# Patient Record
Sex: MALE | Race: WHITE | NOT HISPANIC OR LATINO
[De-identification: names, ages, dates, MRNs, and addresses within clinical notes are randomized per-mention and may not be internally consistent; named-entity substitution may affect disease eponyms.]

---

## 2017-02-22 ENCOUNTER — RX RENEWAL (OUTPATIENT)
Age: 57
End: 2017-02-22

## 2017-04-27 ENCOUNTER — RX RENEWAL (OUTPATIENT)
Age: 57
End: 2017-04-27

## 2017-10-26 ENCOUNTER — RX RENEWAL (OUTPATIENT)
Age: 57
End: 2017-10-26

## 2017-12-18 ENCOUNTER — MEDICATION RENEWAL (OUTPATIENT)
Age: 57
End: 2017-12-18

## 2017-12-31 ENCOUNTER — RX RENEWAL (OUTPATIENT)
Age: 57
End: 2017-12-31

## 2018-04-30 ENCOUNTER — APPOINTMENT (OUTPATIENT)
Dept: INTERNAL MEDICINE | Facility: CLINIC | Age: 58
End: 2018-04-30
Payer: COMMERCIAL

## 2018-04-30 ENCOUNTER — NON-APPOINTMENT (OUTPATIENT)
Age: 58
End: 2018-04-30

## 2018-04-30 VITALS
OXYGEN SATURATION: 99 % | HEART RATE: 84 BPM | SYSTOLIC BLOOD PRESSURE: 143 MMHG | HEIGHT: 68 IN | RESPIRATION RATE: 17 BRPM | BODY MASS INDEX: 40.16 KG/M2 | WEIGHT: 265 LBS | DIASTOLIC BLOOD PRESSURE: 85 MMHG | TEMPERATURE: 97.9 F

## 2018-04-30 DIAGNOSIS — Z86.69 PERSONAL HISTORY OF OTHER DISEASES OF THE NERVOUS SYSTEM AND SENSE ORGANS: ICD-10-CM

## 2018-04-30 PROCEDURE — 93000 ELECTROCARDIOGRAM COMPLETE: CPT

## 2018-04-30 PROCEDURE — 99396 PREV VISIT EST AGE 40-64: CPT

## 2018-04-30 PROCEDURE — 82271 OCCULT BLOOD OTHER SOURCES: CPT | Mod: QW

## 2018-05-04 LAB
ALBUMIN SERPL ELPH-MCNC: 4.9 G/DL
ALP BLD-CCNC: 64 U/L
ALT SERPL-CCNC: 32 U/L
ANION GAP SERPL CALC-SCNC: 12 MMOL/L
APPEARANCE: CLEAR
AST SERPL-CCNC: 23 U/L
BASOPHILS # BLD AUTO: 0.08 K/UL
BASOPHILS NFR BLD AUTO: 1 %
BILIRUB SERPL-MCNC: 1.2 MG/DL
BILIRUBIN URINE: NEGATIVE
BLOOD URINE: NEGATIVE
BUN SERPL-MCNC: 15 MG/DL
CALCIUM SERPL-MCNC: 9.9 MG/DL
CHLORIDE SERPL-SCNC: 99 MMOL/L
CHOLEST SERPL-MCNC: 240 MG/DL
CHOLEST/HDLC SERPL: 3.2 RATIO
CO2 SERPL-SCNC: 26 MMOL/L
COLOR: YELLOW
CREAT SERPL-MCNC: 0.87 MG/DL
EOSINOPHIL # BLD AUTO: 0.29 K/UL
EOSINOPHIL NFR BLD AUTO: 3.6 %
GLUCOSE QUALITATIVE U: NEGATIVE MG/DL
GLUCOSE SERPL-MCNC: 104 MG/DL
HBA1C MFR BLD HPLC: 5.8 %
HCT VFR BLD CALC: 50.3 %
HCV AB SER QL: NONREACTIVE
HCV S/CO RATIO: 0.1 S/CO
HDLC SERPL-MCNC: 76 MG/DL
HGB BLD-MCNC: 16.7 G/DL
IMM GRANULOCYTES NFR BLD AUTO: 0.3 %
KETONES URINE: NEGATIVE
LDLC SERPL CALC-MCNC: 133 MG/DL
LEUKOCYTE ESTERASE URINE: NEGATIVE
LYMPHOCYTES # BLD AUTO: 1.95 K/UL
LYMPHOCYTES NFR BLD AUTO: 24.4 %
MAN DIFF?: NORMAL
MCHC RBC-ENTMCNC: 28.8 PG
MCHC RBC-ENTMCNC: 33.2 GM/DL
MCV RBC AUTO: 86.7 FL
MONOCYTES # BLD AUTO: 0.64 K/UL
MONOCYTES NFR BLD AUTO: 8 %
NEUTROPHILS # BLD AUTO: 5 K/UL
NEUTROPHILS NFR BLD AUTO: 62.7 %
NITRITE URINE: NEGATIVE
PH URINE: 6
PLATELET # BLD AUTO: 247 K/UL
POTASSIUM SERPL-SCNC: 4.5 MMOL/L
PROT SERPL-MCNC: 8.1 G/DL
PROTEIN URINE: NEGATIVE MG/DL
PSA SERPL-MCNC: 0.56 NG/ML
RBC # BLD: 5.8 M/UL
RBC # FLD: 14.1 %
SODIUM SERPL-SCNC: 137 MMOL/L
SPECIFIC GRAVITY URINE: 1.01
TRIGL SERPL-MCNC: 154 MG/DL
TSH SERPL-ACNC: 2.2 UIU/ML
UROBILINOGEN URINE: NEGATIVE MG/DL
WBC # FLD AUTO: 7.98 K/UL

## 2018-05-29 ENCOUNTER — RX RENEWAL (OUTPATIENT)
Age: 58
End: 2018-05-29

## 2018-06-13 ENCOUNTER — RX RENEWAL (OUTPATIENT)
Age: 58
End: 2018-06-13

## 2018-06-18 ENCOUNTER — RX RENEWAL (OUTPATIENT)
Age: 58
End: 2018-06-18

## 2018-08-31 ENCOUNTER — MEDICATION RENEWAL (OUTPATIENT)
Age: 58
End: 2018-08-31

## 2018-12-10 ENCOUNTER — RX RENEWAL (OUTPATIENT)
Age: 58
End: 2018-12-10

## 2019-01-18 ENCOUNTER — APPOINTMENT (OUTPATIENT)
Dept: INTERNAL MEDICINE | Facility: CLINIC | Age: 59
End: 2019-01-18
Payer: COMMERCIAL

## 2019-01-18 VITALS
SYSTOLIC BLOOD PRESSURE: 140 MMHG | HEART RATE: 53 BPM | RESPIRATION RATE: 17 BRPM | WEIGHT: 221 LBS | TEMPERATURE: 97.5 F | HEIGHT: 68 IN | BODY MASS INDEX: 33.49 KG/M2 | DIASTOLIC BLOOD PRESSURE: 80 MMHG | OXYGEN SATURATION: 100 %

## 2019-01-18 DIAGNOSIS — N40.1 BENIGN PROSTATIC HYPERPLASIA WITH LOWER URINARY TRACT SYMPMS: ICD-10-CM

## 2019-01-18 DIAGNOSIS — N13.8 BENIGN PROSTATIC HYPERPLASIA WITH LOWER URINARY TRACT SYMPMS: ICD-10-CM

## 2019-01-18 DIAGNOSIS — R73.03 PREDIABETES.: ICD-10-CM

## 2019-01-18 PROCEDURE — 99214 OFFICE O/P EST MOD 30 MIN: CPT

## 2019-01-20 PROBLEM — R73.03 PREDIABETES: Status: ACTIVE | Noted: 2018-04-30

## 2019-01-20 LAB
ALBUMIN SERPL ELPH-MCNC: 4.8 G/DL
ALP BLD-CCNC: 65 U/L
ALT SERPL-CCNC: 24 U/L
ANION GAP SERPL CALC-SCNC: 13 MMOL/L
AST SERPL-CCNC: 21 U/L
BILIRUB SERPL-MCNC: 1.3 MG/DL
BUN SERPL-MCNC: 15 MG/DL
CALCIUM SERPL-MCNC: 10.2 MG/DL
CHLORIDE SERPL-SCNC: 96 MMOL/L
CO2 SERPL-SCNC: 30 MMOL/L
CREAT SERPL-MCNC: 0.92 MG/DL
CRP SERPL-MCNC: 0.9 MG/DL
GLUCOSE SERPL-MCNC: 88 MG/DL
POTASSIUM SERPL-SCNC: 3.8 MMOL/L
PROT SERPL-MCNC: 8.1 G/DL
SODIUM SERPL-SCNC: 139 MMOL/L

## 2019-01-20 RX ORDER — CICLOPIROX OLAMINE 7.7 MG/ML
0.77 SUSPENSION TOPICAL
Qty: 60 | Refills: 0 | Status: COMPLETED | COMMUNITY
Start: 2018-10-01

## 2019-03-20 ENCOUNTER — MEDICATION RENEWAL (OUTPATIENT)
Age: 59
End: 2019-03-20

## 2019-04-03 ENCOUNTER — MEDICATION RENEWAL (OUTPATIENT)
Age: 59
End: 2019-04-03

## 2019-05-28 ENCOUNTER — APPOINTMENT (OUTPATIENT)
Dept: INTERNAL MEDICINE | Facility: CLINIC | Age: 59
End: 2019-05-28
Payer: COMMERCIAL

## 2019-05-28 VITALS
HEART RATE: 63 BPM | BODY MASS INDEX: 31.83 KG/M2 | SYSTOLIC BLOOD PRESSURE: 130 MMHG | RESPIRATION RATE: 17 BRPM | TEMPERATURE: 98 F | DIASTOLIC BLOOD PRESSURE: 84 MMHG | OXYGEN SATURATION: 98 % | HEIGHT: 68 IN | WEIGHT: 210 LBS

## 2019-05-28 VITALS — SYSTOLIC BLOOD PRESSURE: 118 MMHG | DIASTOLIC BLOOD PRESSURE: 78 MMHG

## 2019-05-28 DIAGNOSIS — Z12.11 ENCOUNTER FOR SCREENING FOR MALIGNANT NEOPLASM OF COLON: ICD-10-CM

## 2019-05-28 DIAGNOSIS — Z00.00 ENCOUNTER FOR GENERAL ADULT MEDICAL EXAMINATION W/OUT ABNORMAL FINDINGS: ICD-10-CM

## 2019-05-28 PROCEDURE — 99396 PREV VISIT EST AGE 40-64: CPT | Mod: 25

## 2019-05-28 PROCEDURE — 90715 TDAP VACCINE 7 YRS/> IM: CPT

## 2019-05-28 PROCEDURE — 82271 OCCULT BLOOD OTHER SOURCES: CPT | Mod: QW

## 2019-05-28 PROCEDURE — 90471 IMMUNIZATION ADMIN: CPT

## 2019-05-29 LAB
25(OH)D3 SERPL-MCNC: 17.4 NG/ML
ALBUMIN SERPL ELPH-MCNC: 4.5 G/DL
ALP BLD-CCNC: 72 U/L
ALT SERPL-CCNC: 25 U/L
ANION GAP SERPL CALC-SCNC: 14 MMOL/L
APPEARANCE: CLEAR
AST SERPL-CCNC: 18 U/L
BASOPHILS # BLD AUTO: 0.05 K/UL
BASOPHILS NFR BLD AUTO: 0.7 %
BILIRUB SERPL-MCNC: 0.5 MG/DL
BILIRUBIN URINE: NEGATIVE
BLOOD URINE: NEGATIVE
BUN SERPL-MCNC: 20 MG/DL
CALCIUM SERPL-MCNC: 9.8 MG/DL
CHLORIDE SERPL-SCNC: 104 MMOL/L
CHOLEST SERPL-MCNC: 179 MG/DL
CHOLEST/HDLC SERPL: 2.9 RATIO
CO2 SERPL-SCNC: 23 MMOL/L
COLOR: NORMAL
CREAT SERPL-MCNC: 0.82 MG/DL
EOSINOPHIL # BLD AUTO: 0.24 K/UL
EOSINOPHIL NFR BLD AUTO: 3.6 %
ESTIMATED AVERAGE GLUCOSE: 103 MG/DL
GLUCOSE QUALITATIVE U: NEGATIVE
GLUCOSE SERPL-MCNC: 114 MG/DL
HBA1C MFR BLD HPLC: 5.2 %
HCT VFR BLD CALC: 51.1 %
HDLC SERPL-MCNC: 61 MG/DL
HGB BLD-MCNC: 16.5 G/DL
IMM GRANULOCYTES NFR BLD AUTO: 0.3 %
KETONES URINE: NEGATIVE
LDLC SERPL CALC-MCNC: 105 MG/DL
LEUKOCYTE ESTERASE URINE: NEGATIVE
LYMPHOCYTES # BLD AUTO: 1.66 K/UL
LYMPHOCYTES NFR BLD AUTO: 24.8 %
MAN DIFF?: NORMAL
MCHC RBC-ENTMCNC: 28.5 PG
MCHC RBC-ENTMCNC: 32.3 GM/DL
MCV RBC AUTO: 88.3 FL
MONOCYTES # BLD AUTO: 0.47 K/UL
MONOCYTES NFR BLD AUTO: 7 %
NEUTROPHILS # BLD AUTO: 4.25 K/UL
NEUTROPHILS NFR BLD AUTO: 63.6 %
NITRITE URINE: NEGATIVE
PH URINE: 6
PLATELET # BLD AUTO: 246 K/UL
POTASSIUM SERPL-SCNC: 4.2 MMOL/L
PROT SERPL-MCNC: 7.7 G/DL
PROTEIN URINE: NORMAL
PSA SERPL-MCNC: 0.54 NG/ML
RBC # BLD: 5.79 M/UL
RBC # FLD: 12.9 %
SODIUM SERPL-SCNC: 141 MMOL/L
SPECIFIC GRAVITY URINE: 1.02
T4 FREE SERPL-MCNC: 1.3 NG/DL
TRIGL SERPL-MCNC: 64 MG/DL
TSH SERPL-ACNC: 2.81 UIU/ML
UROBILINOGEN URINE: NORMAL
VIT B12 SERPL-MCNC: 749 PG/ML
WBC # FLD AUTO: 6.69 K/UL

## 2019-07-01 ENCOUNTER — MEDICATION RENEWAL (OUTPATIENT)
Age: 59
End: 2019-07-01

## 2019-11-25 ENCOUNTER — TRANSCRIPTION ENCOUNTER (OUTPATIENT)
Age: 59
End: 2019-11-25

## 2020-01-29 ENCOUNTER — APPOINTMENT (OUTPATIENT)
Dept: INTERNAL MEDICINE | Facility: CLINIC | Age: 60
End: 2020-01-29
Payer: COMMERCIAL

## 2020-01-29 VITALS
DIASTOLIC BLOOD PRESSURE: 86 MMHG | HEIGHT: 68 IN | OXYGEN SATURATION: 99 % | RESPIRATION RATE: 17 BRPM | TEMPERATURE: 98.6 F | HEART RATE: 68 BPM | WEIGHT: 238 LBS | BODY MASS INDEX: 36.07 KG/M2 | SYSTOLIC BLOOD PRESSURE: 142 MMHG

## 2020-01-29 DIAGNOSIS — M79.641 PAIN IN RIGHT HAND: ICD-10-CM

## 2020-01-29 DIAGNOSIS — M79.642 PAIN IN RIGHT HAND: ICD-10-CM

## 2020-01-29 DIAGNOSIS — M17.0 BILATERAL PRIMARY OSTEOARTHRITIS OF KNEE: ICD-10-CM

## 2020-01-29 DIAGNOSIS — E66.9 OBESITY, UNSPECIFIED: ICD-10-CM

## 2020-01-29 PROCEDURE — 99214 OFFICE O/P EST MOD 30 MIN: CPT

## 2020-01-29 NOTE — PHYSICAL EXAM
[No Acute Distress] : no acute distress [Well Nourished] : well nourished [Normal Sclera/Conjunctiva] : normal sclera/conjunctiva [Well-Appearing] : well-appearing [Well Developed] : well developed [PERRL] : pupils equal round and reactive to light [EOMI] : extraocular movements intact [Normal Outer Ear/Nose] : the outer ears and nose were normal in appearance [Normal Oropharynx] : the oropharynx was normal [No JVD] : no jugular venous distention [No Lymphadenopathy] : no lymphadenopathy [Thyroid Normal, No Nodules] : the thyroid was normal and there were no nodules present [Supple] : supple [No Respiratory Distress] : no respiratory distress  [No Accessory Muscle Use] : no accessory muscle use [Normal Rate] : normal rate  [Clear to Auscultation] : lungs were clear to auscultation bilaterally [Regular Rhythm] : with a regular rhythm [Normal S1, S2] : normal S1 and S2 [No Murmur] : no murmur heard [No Carotid Bruits] : no carotid bruits [No Varicosities] : no varicosities [No Abdominal Bruit] : a ~M bruit was not heard ~T in the abdomen [Pedal Pulses Present] : the pedal pulses are present [No Edema] : there was no peripheral edema [No Palpable Aorta] : no palpable aorta [No Extremity Clubbing/Cyanosis] : no extremity clubbing/cyanosis [Non Tender] : non-tender [Soft] : abdomen soft [Non-distended] : non-distended [No Masses] : no abdominal mass palpated [Normal Bowel Sounds] : normal bowel sounds [No HSM] : no HSM [Normal Posterior Cervical Nodes] : no posterior cervical lymphadenopathy [No CVA Tenderness] : no CVA  tenderness [Normal Anterior Cervical Nodes] : no anterior cervical lymphadenopathy [No Joint Swelling] : no joint swelling [No Spinal Tenderness] : no spinal tenderness [No Rash] : no rash [Grossly Normal Strength/Tone] : grossly normal strength/tone [Coordination Grossly Intact] : coordination grossly intact [No Focal Deficits] : no focal deficits [Normal Affect] : the affect was normal [Deep Tendon Reflexes (DTR)] : deep tendon reflexes were 2+ and symmetric [Normal Gait] : normal gait [Normal Insight/Judgement] : insight and judgment were intact

## 2020-01-29 NOTE — HISTORY OF PRESENT ILLNESS
[FreeTextEntry1] : follow up hypertension, needs refills [de-identified] : 59 years old male with hypertension, here for follow up, ran out of medications, requesting refills, offers no acute complains, sttates doing well; states he had h/o pain both hands , wrist, and knees chronic

## 2020-06-01 ENCOUNTER — APPOINTMENT (OUTPATIENT)
Dept: INTERNAL MEDICINE | Facility: CLINIC | Age: 60
End: 2020-06-01

## 2020-10-29 ENCOUNTER — TRANSCRIPTION ENCOUNTER (OUTPATIENT)
Age: 60
End: 2020-10-29

## 2021-02-03 ENCOUNTER — EMERGENCY (EMERGENCY)
Facility: HOSPITAL | Age: 61
LOS: 1 days | Discharge: HOME | End: 2021-02-03
Attending: STUDENT IN AN ORGANIZED HEALTH CARE EDUCATION/TRAINING PROGRAM
Payer: COMMERCIAL

## 2021-02-03 VITALS
WEIGHT: 255.07 LBS | TEMPERATURE: 100 F | SYSTOLIC BLOOD PRESSURE: 127 MMHG | DIASTOLIC BLOOD PRESSURE: 83 MMHG | HEIGHT: 68 IN | OXYGEN SATURATION: 96 % | HEART RATE: 93 BPM | RESPIRATION RATE: 16 BRPM

## 2021-02-03 VITALS
OXYGEN SATURATION: 96 % | TEMPERATURE: 101 F | SYSTOLIC BLOOD PRESSURE: 131 MMHG | DIASTOLIC BLOOD PRESSURE: 83 MMHG | HEART RATE: 90 BPM | RESPIRATION RATE: 20 BRPM

## 2021-02-03 PROCEDURE — 71045 X-RAY EXAM CHEST 1 VIEW: CPT | Mod: 26

## 2021-02-03 PROCEDURE — 99283 EMERGENCY DEPT VISIT LOW MDM: CPT | Mod: 25

## 2021-02-03 PROCEDURE — 71045 X-RAY EXAM CHEST 1 VIEW: CPT

## 2021-02-03 PROCEDURE — 99284 EMERGENCY DEPT VISIT MOD MDM: CPT

## 2021-02-03 RX ORDER — DEXAMETHASONE 0.5 MG/5ML
6 ELIXIR ORAL ONCE
Refills: 0 | Status: DISCONTINUED | OUTPATIENT
Start: 2021-02-03 | End: 2021-02-03

## 2021-02-03 RX ORDER — ACETAMINOPHEN 500 MG
975 TABLET ORAL ONCE
Refills: 0 | Status: COMPLETED | OUTPATIENT
Start: 2021-02-03 | End: 2021-02-03

## 2021-02-03 RX ADMIN — Medication 975 MILLIGRAM(S): at 19:01

## 2021-02-03 NOTE — ED ADULT NURSE NOTE - OBJECTIVE STATEMENT
59 y/o male with PMH obesity, HTN, presenting to ED for hypoxia on home pulse ox. Pt tested positive 1/29 for covid. Pt reports that his pulse ox was 91% at home, pt maintaining SPO2 of 97% in ER, with ambulating sat of 95%. Pt endorses fever/chills, myalgias at home with worsening sx today.  Upon exam pt A&Ox3 gross neuro intact, lungs cta bilaterally, no difficulty speaking in complete sentences, s1s2 heart sounds heard, pulses x 4, ontiveros x4, abdomen soft nontender nondistended, skin intact. pt denies chest pain, sob, ha, n/v/d, abdominal pain, f/c, urinary symptoms, hematuria

## 2021-02-03 NOTE — ED PROVIDER NOTE - PATIENT PORTAL LINK FT
You can access the FollowMyHealth Patient Portal offered by [facility] by registering at the following website: [email or web address]. By joining [facility]’s FollowMyHealth portal, you will also be able to view your health information using other applications (apps) compatible with our system.

## 2021-02-03 NOTE — ED PROVIDER NOTE - OBJECTIVE STATEMENT
61 y/o m PMH of obesity, HTN , non-smoker, COVID+ 1/29/21, sxs since 1/27/21, presents for hypoxia on home pulseox. Pt reports he measured his pulseox at home today and it was 91% on several readings, called his PCP and referred to the ED. Pt notes he has had f/c, HA, and myalgias as his predominant sxs, no change in sxs or worsening of sxs today, and no complaints at this time. Pt denies SOB, CP, TREVIÑO, abd pain, n/v/d. pt tolerating PO. Pt only meds are antihypertensives.

## 2021-02-03 NOTE — ED PROVIDER NOTE - CLINICAL SUMMARY MEDICAL DECISION MAKING FREE TEXT BOX
60M pmh obesity, HTN, presenting for eval of hypoxia on home pulse ox, reading had been 91% at home but here is 96%, notes fevers / chills at home, also headaches and body aches. Denies actively feeling SOB and on ambulation goes to 95%, is not currently on any covid medications, will obtain cxr, provide antipyretic, possibly discharge on decadron if no obvious lobar pneumonia or other issue of concern.

## 2021-02-03 NOTE — ED PROVIDER NOTE - NSFOLLOWUPINSTRUCTIONS_ED_ALL_ED_FT
1) Please call your PCP in 1-2 days.    2) Rest and drink plenty of fluids.     3) Fever and pain can be managed with Tylenol and Ibuprofen over the counter as directed.    4) Return to the ER for any new or worsening symptoms, shortness of breath, chest pain, rash.    Please quarantine for 14 days or until asymptomatic for at least 3 consecutive days.  Any close contacts should also be advised to quarantine for 14 days.    Please read all patient information on COVID-19.

## 2021-02-03 NOTE — ED PROVIDER NOTE - PSH
cystoscopy  3/10  History of Tonsillectomy and ? adnoid removal    S/P Arthroscopy of Knee lt X2  12/05, 12/08

## 2021-02-03 NOTE — ED PROVIDER NOTE - PHYSICAL EXAMINATION
GEN: Pt in NAD, non-toxic, alert.  PSYCH: Anxious..  EYES: Sclera white w/o injection.   ENT: No dysphonia. Neck supple FROM. Trachea midline.   RESP: No evidence of respiratory distress, speaking in full sentences, SpO2 96 RA at rest, 95% with ambulation. CTAB. No wheezing.  CARDIAC: RRR, clear distinct S1, S2, no appreciable murmurs.  ABD: Abdomen soft, non-tender.  VASC: 2+ radial and dorsalis pedis pulses b/l. No edema or calf tenderness.  SKIN: No rashes on the trunk.

## 2021-02-06 ENCOUNTER — INPATIENT (INPATIENT)
Facility: HOSPITAL | Age: 61
LOS: 8 days | Discharge: HOME | End: 2021-02-15
Attending: INTERNAL MEDICINE | Admitting: INTERNAL MEDICINE
Payer: COMMERCIAL

## 2021-02-06 VITALS
DIASTOLIC BLOOD PRESSURE: 48 MMHG | TEMPERATURE: 103 F | SYSTOLIC BLOOD PRESSURE: 111 MMHG | RESPIRATION RATE: 97 BRPM | HEART RATE: 96 BPM | HEIGHT: 68 IN | OXYGEN SATURATION: 96 %

## 2021-02-06 DIAGNOSIS — U07.1 COVID-19: ICD-10-CM

## 2021-02-06 DIAGNOSIS — E87.6 HYPOKALEMIA: ICD-10-CM

## 2021-02-06 DIAGNOSIS — N28.9 DISORDER OF KIDNEY AND URETER, UNSPECIFIED: ICD-10-CM

## 2021-02-06 DIAGNOSIS — R09.02 HYPOXEMIA: ICD-10-CM

## 2021-02-06 DIAGNOSIS — E83.39 OTHER DISORDERS OF PHOSPHORUS METABOLISM: ICD-10-CM

## 2021-02-06 DIAGNOSIS — E66.9 OBESITY, UNSPECIFIED: ICD-10-CM

## 2021-02-06 DIAGNOSIS — F41.9 ANXIETY DISORDER, UNSPECIFIED: ICD-10-CM

## 2021-02-06 DIAGNOSIS — E87.1 HYPO-OSMOLALITY AND HYPONATREMIA: ICD-10-CM

## 2021-02-06 DIAGNOSIS — D33.3 BENIGN NEOPLASM OF CRANIAL NERVES: ICD-10-CM

## 2021-02-06 LAB
ALBUMIN SERPL ELPH-MCNC: 3.3 G/DL — SIGNIFICANT CHANGE UP (ref 3.3–5)
ALP SERPL-CCNC: 63 U/L — SIGNIFICANT CHANGE UP (ref 40–120)
ALT FLD-CCNC: 56 U/L — HIGH (ref 4–41)
ANION GAP SERPL CALC-SCNC: 14 MMOL/L — SIGNIFICANT CHANGE UP (ref 7–14)
APTT BLD: 32.4 SEC — SIGNIFICANT CHANGE UP (ref 27–36.3)
AST SERPL-CCNC: 108 U/L — HIGH (ref 4–40)
BASOPHILS # BLD AUTO: 0 K/UL — SIGNIFICANT CHANGE UP (ref 0–0.2)
BASOPHILS NFR BLD AUTO: 0 % — SIGNIFICANT CHANGE UP (ref 0–2)
BILIRUB SERPL-MCNC: 1.2 MG/DL — SIGNIFICANT CHANGE UP (ref 0.2–1.2)
BLOOD GAS VENOUS COMPREHENSIVE RESULT: SIGNIFICANT CHANGE UP
BUN SERPL-MCNC: 39 MG/DL — HIGH (ref 7–23)
CALCIUM SERPL-MCNC: 8.5 MG/DL — SIGNIFICANT CHANGE UP (ref 8.4–10.5)
CHLORIDE SERPL-SCNC: 90 MMOL/L — LOW (ref 98–107)
CO2 SERPL-SCNC: 28 MMOL/L — SIGNIFICANT CHANGE UP (ref 22–31)
CREAT SERPL-MCNC: 1.23 MG/DL — SIGNIFICANT CHANGE UP (ref 0.5–1.3)
CRP SERPL-MCNC: 235.6 MG/L — HIGH
D DIMER BLD IA.RAPID-MCNC: 1092 NG/ML DDU — HIGH
EOSINOPHIL # BLD AUTO: 0 K/UL — SIGNIFICANT CHANGE UP (ref 0–0.5)
EOSINOPHIL NFR BLD AUTO: 0 % — SIGNIFICANT CHANGE UP (ref 0–6)
FERRITIN SERPL-MCNC: 4287 NG/ML — HIGH (ref 30–400)
FIBRINOGEN PPP-MCNC: 690 MG/DL — HIGH (ref 290–520)
GLUCOSE SERPL-MCNC: 128 MG/DL — HIGH (ref 70–99)
HCT VFR BLD CALC: 47.4 % — SIGNIFICANT CHANGE UP (ref 39–50)
HGB BLD-MCNC: 15.7 G/DL — SIGNIFICANT CHANGE UP (ref 13–17)
IANC: 4.9 K/UL — SIGNIFICANT CHANGE UP (ref 1.5–8.5)
IMM GRANULOCYTES NFR BLD AUTO: 0.7 % — SIGNIFICANT CHANGE UP (ref 0–1.5)
INR BLD: 1.34 RATIO — HIGH (ref 0.88–1.16)
LYMPHOCYTES # BLD AUTO: 0.47 K/UL — LOW (ref 1–3.3)
LYMPHOCYTES # BLD AUTO: 8.4 % — LOW (ref 13–44)
MCHC RBC-ENTMCNC: 27.5 PG — SIGNIFICANT CHANGE UP (ref 27–34)
MCHC RBC-ENTMCNC: 33.1 GM/DL — SIGNIFICANT CHANGE UP (ref 32–36)
MCV RBC AUTO: 83 FL — SIGNIFICANT CHANGE UP (ref 80–100)
MONOCYTES # BLD AUTO: 0.16 K/UL — SIGNIFICANT CHANGE UP (ref 0–0.9)
MONOCYTES NFR BLD AUTO: 2.9 % — SIGNIFICANT CHANGE UP (ref 2–14)
NEUTROPHILS # BLD AUTO: 4.9 K/UL — SIGNIFICANT CHANGE UP (ref 1.8–7.4)
NEUTROPHILS NFR BLD AUTO: 88 % — HIGH (ref 43–77)
NRBC # BLD: 0 /100 WBCS — SIGNIFICANT CHANGE UP
NRBC # FLD: 0 K/UL — SIGNIFICANT CHANGE UP
PLATELET # BLD AUTO: 250 K/UL — SIGNIFICANT CHANGE UP (ref 150–400)
POTASSIUM SERPL-MCNC: 3.3 MMOL/L — LOW (ref 3.5–5.3)
POTASSIUM SERPL-SCNC: 3.3 MMOL/L — LOW (ref 3.5–5.3)
PROCALCITONIN SERPL-MCNC: 1.26 NG/ML — HIGH (ref 0.02–0.1)
PROT SERPL-MCNC: 7.1 G/DL — SIGNIFICANT CHANGE UP (ref 6–8.3)
PROTHROM AB SERPL-ACNC: 15.2 SEC — HIGH (ref 10.6–13.6)
RBC # BLD: 5.71 M/UL — SIGNIFICANT CHANGE UP (ref 4.2–5.8)
RBC # FLD: 13.3 % — SIGNIFICANT CHANGE UP (ref 10.3–14.5)
SARS-COV-2 RNA SPEC QL NAA+PROBE: DETECTED
SODIUM SERPL-SCNC: 132 MMOL/L — LOW (ref 135–145)
WBC # BLD: 5.57 K/UL — SIGNIFICANT CHANGE UP (ref 3.8–10.5)
WBC # FLD AUTO: 5.57 K/UL — SIGNIFICANT CHANGE UP (ref 3.8–10.5)

## 2021-02-06 PROCEDURE — 99284 EMERGENCY DEPT VISIT MOD MDM: CPT

## 2021-02-06 PROCEDURE — 71275 CT ANGIOGRAPHY CHEST: CPT | Mod: 26

## 2021-02-06 PROCEDURE — 99222 1ST HOSP IP/OBS MODERATE 55: CPT

## 2021-02-06 PROCEDURE — 71045 X-RAY EXAM CHEST 1 VIEW: CPT | Mod: 26

## 2021-02-06 RX ORDER — POTASSIUM CHLORIDE 20 MEQ
10 PACKET (EA) ORAL
Refills: 0 | Status: DISCONTINUED | OUTPATIENT
Start: 2021-02-06 | End: 2021-02-06

## 2021-02-06 RX ORDER — ACETAMINOPHEN 500 MG
975 TABLET ORAL ONCE
Refills: 0 | Status: COMPLETED | OUTPATIENT
Start: 2021-02-06 | End: 2021-02-06

## 2021-02-06 RX ORDER — ENOXAPARIN SODIUM 100 MG/ML
40 INJECTION SUBCUTANEOUS EVERY 12 HOURS
Refills: 0 | Status: DISCONTINUED | OUTPATIENT
Start: 2021-02-06 | End: 2021-02-15

## 2021-02-06 RX ORDER — DEXAMETHASONE 0.5 MG/5ML
6 ELIXIR ORAL ONCE
Refills: 0 | Status: COMPLETED | OUTPATIENT
Start: 2021-02-06 | End: 2021-02-06

## 2021-02-06 RX ORDER — DEXAMETHASONE 0.5 MG/5ML
6 ELIXIR ORAL DAILY
Refills: 0 | Status: COMPLETED | OUTPATIENT
Start: 2021-02-07 | End: 2021-02-15

## 2021-02-06 RX ORDER — ALBUTEROL 90 UG/1
2 AEROSOL, METERED ORAL ONCE
Refills: 0 | Status: COMPLETED | OUTPATIENT
Start: 2021-02-06 | End: 2021-02-06

## 2021-02-06 RX ORDER — POTASSIUM CHLORIDE 20 MEQ
40 PACKET (EA) ORAL ONCE
Refills: 0 | Status: COMPLETED | OUTPATIENT
Start: 2021-02-06 | End: 2021-02-06

## 2021-02-06 RX ADMIN — Medication 40 MILLIEQUIVALENT(S): at 21:15

## 2021-02-06 RX ADMIN — Medication 6 MILLIGRAM(S): at 18:58

## 2021-02-06 RX ADMIN — Medication 975 MILLIGRAM(S): at 18:58

## 2021-02-06 RX ADMIN — ALBUTEROL 2 PUFF(S): 90 AEROSOL, METERED ORAL at 18:58

## 2021-02-06 NOTE — ED PROVIDER NOTE - CLINICAL SUMMARY MEDICAL DECISION MAKING FREE TEXT BOX
59 Y/O M PMH Acoustic Neuroma, Anxiety Disorder, Obesity, Renal/Ureteral Disease presents with hypoxia. Pt states he was diagnosed with COVID-19 7 days ago, states he has been checking his O2 at home and has been at 86 at rest. Pt is stable on a N/C in the ER, given Decadron, will admit for COVID-19 with associated hypoxia.

## 2021-02-06 NOTE — ED ADULT NURSE REASSESSMENT NOTE - NS ED NURSE REASSESS COMMENT FT1
Received report form FARHAN Tineo. Patient appears comfortable at this time, NSR on monitor. Patient requires 4L nasal cannula oxygen saturation at 95% at this time. Breathing equal and unlabored, provided comfort measures at this time. Medicated as per MD orders. Vitals as noted.

## 2021-02-06 NOTE — H&P ADULT - PROBLEM SELECTOR PLAN 1
Reports positive COVID-19 diagnosis 7 days ago  CXR on this admission: Bilateral patchy opacities in the mid to lower lung fields  On supplemental nasal cannula  COVID-19 PCR pending  S/p decadron in the ED; will continue  Started on Remdesivir  Elevated Inflammatory markers; trend q48-72h   Procal: 1.26; sent sputum cx, and FU repeat procal in the AM  Continuous pulseox  Prone, Taper, and wean Reports positive COVID-19 diagnosis 7 days ago  CXR on this admission: Bilateral patchy opacities in the mid to lower lung fields  On supplemental nasal cannula  COVID-19 PCR pending  S/p decadron in the ED; will continue  Started on Remdesivir  Elevated Inflammatory markers; trend q48-72h   Procal: 1.26; sent sputum cx, and FU repeat procal in the AM  Ddimer: 1092; FU CTA  Continuous pulseox  Prone, Taper, and wean

## 2021-02-06 NOTE — ED PROVIDER NOTE - ATTENDING CONTRIBUTION TO CARE
I performed a history and physical exam of the patient and discussed their management with the advanced care provider. I reviewed the advanced care provider's note and agree with the documented findings and plan of care. My medical decision making and objective findings are found above.     59 y/o male with obesity diagnosed with COVID-19 7 days ago, now with hypoxia, SOB, no CP, +fever, +body aches, no abdominal pain, on exam pt stable on 4lNC, Lungs CTA b/l, Cardiac RRR, Abd soft NT/ND, ext no C/C/E, 1)CXR, EKG 2)all COVID labs 3)Decadrom 4)admit

## 2021-02-06 NOTE — H&P ADULT - ASSESSMENT
59 y/o male diagnosed with COVID-19 a week ago, came in for SOB. Found to be hypoxic on RA likely due to Covid-19 pneumonia.

## 2021-02-06 NOTE — ED PROVIDER NOTE - CONSTITUTIONAL, MLM
Ill appearing,  awake, alert, oriented to person, place, time/situation and in mild distress normal...

## 2021-02-06 NOTE — H&P ADULT - HISTORY OF PRESENT ILLNESS
59 Y/O M PMH Acoustic Neuroma, Anxiety Disorder, Obesity, Renal/Ureteral Disease presents with hypoxia and cough. Pt states he was diagnosed with COVID-19 7 days ago, states he has been checking his O2 at home and has been in the 90s, but went down to 86 at rest today. Pt also reports having cough, chills and diarrhea for the past few days. Pt denies chest pain, dizziness, palpitation, n/v, AP,  symptoms.

## 2021-02-06 NOTE — ED PROVIDER NOTE - OBJECTIVE STATEMENT
59 Y/O M PMH Acoustic Neuroma    Anxiety Disorder    Obesity    Renal/Ureteral Disease 61 Y/O M PMH Acoustic Neuroma, Anxiety Disorder, Obesity, Renal/Ureteral Disease presents with hypoxia. Pt states he was diagnosed with COVID-19 7 days ago, states he has been checking his O2 at home and has been at 86 at rest. Pt notes shortness of breath as well as fever and chills. Pt denies any other sx or acute complaints.

## 2021-02-06 NOTE — ED ADULT NURSE NOTE - OBJECTIVE STATEMENT
Villa RN: 59 y/o M received to room 25 c/o sob. Pt a&ox4 and ambulatory. pt states he was dx with covid19 a week ago. Pt states since being dx hes had sob, jiang, generalized weakness and fevers. Pt states " I came to the hospital today bc my pulse ox was reading mid-80s." Pt respirations even and unlabored. pt noted to be 92% on RA, pt placed on 4L nc sating 97%.Pt abdomen soft nontender nondistended. Pt skin intact. Pt denies n/v/d, ha, cp, palpitations or lightheadedness. Pt placed on cardiac monitor reading NSR. Vital signs as noted, call bell in reach, comfort measures provided, 20G IV placed R AC, labs drawn and sent, will give report to primary RN.

## 2021-02-06 NOTE — H&P ADULT - ATTENDING COMMENTS
DVT Prophylaxis:    No weight recorded, however pt is visibly morbidly obese. Started on Lovenox 40mg BID for prophylaxis DVT Prophylaxis:    No weight recorded, however pt is visibly morbidly obese. Started on Lovenox 40mg BID for prophylaxis  Ddimer: 1092; FU CTA

## 2021-02-06 NOTE — H&P ADULT - NSHPPHYSICALEXAM_GEN_ALL_CORE
Vital Signs Last 24 Hrs  T(C): 37.3 (06 Feb 2021 21:10), Max: 39.6 (06 Feb 2021 17:18)  T(F): 99.1 (06 Feb 2021 21:10), Max: 103.2 (06 Feb 2021 17:18)  HR: 79 (06 Feb 2021 21:10) (79 - 96)  BP: 134/74 (06 Feb 2021 21:10) (111/48 - 141/70)  BP(mean): --  RR: 20 (06 Feb 2021 21:10) (20 - 97)  SpO2: 95% (06 Feb 2021 21:10) (95% - 97%)    CONSTITUTIONAL: Obese, well-developed, well-groomed, no apparent distress on nc  EYES: no conjunctival or scleral injection, non-icteric, PERRLA  ENMT: no external nasal lesions, oral mucosa with moist membranes  NECK: trachea midline, no palpable neck mass   RESPIRATORY: breathing comfortably, lungs CTA without wheeze/rales/rhonchi  CARDDIOVASCULAR: regular rate and rhythm; +S1S2, no murmurs, rubs, or gallops, no lower extremity edema, 2+ peripheral pulses  GASTROINTESTINAL: soft, nontender, nondistended; +BS throughout, no rebound/guarding  MUSCULOSKELETAL: no joint effusions, normal strength and tone of extremities   NEUROLOGIC: non-focal, sensation intact to light touch in b/l upper and lower extremities   PSYCHIATRIC: AAOx3, appropriate mood and affect  SKIN: no rashes or lesions, warm

## 2021-02-06 NOTE — H&P ADULT - NSHPLABSRESULTS_GEN_ALL_CORE
< from: Xray Chest 1 View- PORTABLE-Urgent (02.06.21 @ 19:13) >    ***PRELIMINARY REPORT******    ******PRELIMINARY REPORT******            EXAM:  XR CHEST PORTABLE URGENT 1V        PROCEDURE DATE:  Feb 6 2021     ******PRELIMINARY REPORT******    ******PRELIMINARY REPORT******            INTERPRETATION:  Bilateral patchy opacities in the mid to lower lung fields          ******PRELIMINARY REPORT******    ******PRELIMINARY REPORT******          MAUREEN MCFARLAND MD; Resident Interventional Radiology    < end of copied text >

## 2021-02-07 LAB
A1C WITH ESTIMATED AVERAGE GLUCOSE RESULT: 5.8 % — HIGH (ref 4–5.6)
ALBUMIN SERPL ELPH-MCNC: 3 G/DL — LOW (ref 3.3–5)
ALBUMIN SERPL ELPH-MCNC: 3.1 G/DL — LOW (ref 3.3–5)
ALP SERPL-CCNC: 62 U/L — SIGNIFICANT CHANGE UP (ref 40–120)
ALP SERPL-CCNC: 64 U/L — SIGNIFICANT CHANGE UP (ref 40–120)
ALT FLD-CCNC: 54 U/L — HIGH (ref 4–41)
ALT FLD-CCNC: 61 U/L — HIGH (ref 4–41)
ANION GAP SERPL CALC-SCNC: 14 MMOL/L — SIGNIFICANT CHANGE UP (ref 7–14)
AST SERPL-CCNC: 104 U/L — HIGH (ref 4–40)
AST SERPL-CCNC: 96 U/L — HIGH (ref 4–40)
BILIRUB DIRECT SERPL-MCNC: 0.3 MG/DL — HIGH (ref 0–0.2)
BILIRUB INDIRECT FLD-MCNC: 0.8 MG/DL — SIGNIFICANT CHANGE UP (ref 0–1)
BILIRUB SERPL-MCNC: 1.1 MG/DL — SIGNIFICANT CHANGE UP (ref 0.2–1.2)
BILIRUB SERPL-MCNC: 1.1 MG/DL — SIGNIFICANT CHANGE UP (ref 0.2–1.2)
BUN SERPL-MCNC: 34 MG/DL — HIGH (ref 7–23)
CALCIUM SERPL-MCNC: 8.7 MG/DL — SIGNIFICANT CHANGE UP (ref 8.4–10.5)
CHLORIDE SERPL-SCNC: 93 MMOL/L — LOW (ref 98–107)
CO2 SERPL-SCNC: 28 MMOL/L — SIGNIFICANT CHANGE UP (ref 22–31)
CREAT SERPL-MCNC: 0.99 MG/DL — SIGNIFICANT CHANGE UP (ref 0.5–1.3)
CREAT SERPL-MCNC: 1.03 MG/DL — SIGNIFICANT CHANGE UP (ref 0.5–1.3)
ESTIMATED AVERAGE GLUCOSE: 120 MG/DL — HIGH (ref 68–114)
GLUCOSE SERPL-MCNC: 163 MG/DL — HIGH (ref 70–99)
HCT VFR BLD CALC: 47 % — SIGNIFICANT CHANGE UP (ref 39–50)
HCV AB S/CO SERPL IA: 0.07 S/CO — SIGNIFICANT CHANGE UP (ref 0–0.99)
HCV AB SERPL-IMP: SIGNIFICANT CHANGE UP
HGB BLD-MCNC: 15.3 G/DL — SIGNIFICANT CHANGE UP (ref 13–17)
MCHC RBC-ENTMCNC: 27.5 PG — SIGNIFICANT CHANGE UP (ref 27–34)
MCHC RBC-ENTMCNC: 32.6 GM/DL — SIGNIFICANT CHANGE UP (ref 32–36)
MCV RBC AUTO: 84.4 FL — SIGNIFICANT CHANGE UP (ref 80–100)
NRBC # BLD: 0 /100 WBCS — SIGNIFICANT CHANGE UP
NRBC # FLD: 0 K/UL — SIGNIFICANT CHANGE UP
PLATELET # BLD AUTO: 246 K/UL — SIGNIFICANT CHANGE UP (ref 150–400)
POTASSIUM SERPL-MCNC: 3.3 MMOL/L — LOW (ref 3.5–5.3)
POTASSIUM SERPL-SCNC: 3.3 MMOL/L — LOW (ref 3.5–5.3)
PROCALCITONIN SERPL-MCNC: 2.4 NG/ML — HIGH (ref 0.02–0.1)
PROT SERPL-MCNC: 6.7 G/DL — SIGNIFICANT CHANGE UP (ref 6–8.3)
PROT SERPL-MCNC: 7 G/DL — SIGNIFICANT CHANGE UP (ref 6–8.3)
RBC # BLD: 5.57 M/UL — SIGNIFICANT CHANGE UP (ref 4.2–5.8)
RBC # FLD: 13.3 % — SIGNIFICANT CHANGE UP (ref 10.3–14.5)
SODIUM SERPL-SCNC: 135 MMOL/L — SIGNIFICANT CHANGE UP (ref 135–145)
WBC # BLD: 5.69 K/UL — SIGNIFICANT CHANGE UP (ref 3.8–10.5)
WBC # FLD AUTO: 5.69 K/UL — SIGNIFICANT CHANGE UP (ref 3.8–10.5)

## 2021-02-07 PROCEDURE — 99232 SBSQ HOSP IP/OBS MODERATE 35: CPT

## 2021-02-07 RX ORDER — REMDESIVIR 5 MG/ML
100 INJECTION INTRAVENOUS EVERY 24 HOURS
Refills: 0 | Status: COMPLETED | OUTPATIENT
Start: 2021-02-08 | End: 2021-02-11

## 2021-02-07 RX ORDER — REMDESIVIR 5 MG/ML
200 INJECTION INTRAVENOUS EVERY 24 HOURS
Refills: 0 | Status: COMPLETED | OUTPATIENT
Start: 2021-02-07 | End: 2021-02-07

## 2021-02-07 RX ORDER — ACETAMINOPHEN 500 MG
1000 TABLET ORAL EVERY 12 HOURS
Refills: 0 | Status: DISCONTINUED | OUTPATIENT
Start: 2021-02-07 | End: 2021-02-15

## 2021-02-07 RX ORDER — POTASSIUM CHLORIDE 20 MEQ
40 PACKET (EA) ORAL ONCE
Refills: 0 | Status: COMPLETED | OUTPATIENT
Start: 2021-02-07 | End: 2021-02-07

## 2021-02-07 RX ORDER — AZELASTINE 137 UG/1
2 SPRAY, METERED NASAL
Qty: 0 | Refills: 0 | DISCHARGE

## 2021-02-07 RX ORDER — FLUTICASONE PROPIONATE 50 MCG
2 SPRAY, SUSPENSION NASAL
Qty: 0 | Refills: 0 | DISCHARGE

## 2021-02-07 RX ORDER — POTASSIUM PHOSPHATE, MONOBASIC POTASSIUM PHOSPHATE, DIBASIC 236; 224 MG/ML; MG/ML
15 INJECTION, SOLUTION INTRAVENOUS ONCE
Refills: 0 | Status: COMPLETED | OUTPATIENT
Start: 2021-02-07 | End: 2021-02-07

## 2021-02-07 RX ORDER — MELOXICAM 15 MG/1
1 TABLET ORAL
Qty: 0 | Refills: 0 | DISCHARGE

## 2021-02-07 RX ORDER — LANOLIN ALCOHOL/MO/W.PET/CERES
9 CREAM (GRAM) TOPICAL AT BEDTIME
Refills: 0 | Status: DISCONTINUED | OUTPATIENT
Start: 2021-02-07 | End: 2021-02-15

## 2021-02-07 RX ORDER — LANOLIN ALCOHOL/MO/W.PET/CERES
10 CREAM (GRAM) TOPICAL AT BEDTIME
Refills: 0 | Status: DISCONTINUED | OUTPATIENT
Start: 2021-02-07 | End: 2021-02-07

## 2021-02-07 RX ORDER — LANOLIN ALCOHOL/MO/W.PET/CERES
1.5 CREAM (GRAM) TOPICAL
Qty: 0 | Refills: 0 | DISCHARGE

## 2021-02-07 RX ORDER — REMDESIVIR 5 MG/ML
INJECTION INTRAVENOUS
Refills: 0 | Status: COMPLETED | OUTPATIENT
Start: 2021-02-07 | End: 2021-02-11

## 2021-02-07 RX ORDER — FLUTICASONE PROPIONATE 50 MCG
2 SPRAY, SUSPENSION NASAL EVERY 12 HOURS
Refills: 0 | Status: DISCONTINUED | OUTPATIENT
Start: 2021-02-07 | End: 2021-02-15

## 2021-02-07 RX ADMIN — REMDESIVIR 500 MILLIGRAM(S): 5 INJECTION INTRAVENOUS at 22:57

## 2021-02-07 RX ADMIN — Medication 9 MILLIGRAM(S): at 21:57

## 2021-02-07 RX ADMIN — Medication 100 MILLIGRAM(S): at 21:57

## 2021-02-07 RX ADMIN — Medication 100 MILLIGRAM(S): at 16:43

## 2021-02-07 RX ADMIN — ENOXAPARIN SODIUM 40 MILLIGRAM(S): 100 INJECTION SUBCUTANEOUS at 19:46

## 2021-02-07 RX ADMIN — Medication 1000 MILLIGRAM(S): at 16:43

## 2021-02-07 RX ADMIN — Medication 40 MILLIEQUIVALENT(S): at 09:33

## 2021-02-07 RX ADMIN — ENOXAPARIN SODIUM 40 MILLIGRAM(S): 100 INJECTION SUBCUTANEOUS at 04:01

## 2021-02-07 RX ADMIN — Medication 6 MILLIGRAM(S): at 04:00

## 2021-02-07 RX ADMIN — Medication 63.75 MILLIMOLE(S): at 04:00

## 2021-02-07 RX ADMIN — POTASSIUM PHOSPHATE, MONOBASIC POTASSIUM PHOSPHATE, DIBASIC 62.5 MILLIMOLE(S): 236; 224 INJECTION, SOLUTION INTRAVENOUS at 09:32

## 2021-02-07 RX ADMIN — Medication 2 SPRAY(S): at 19:46

## 2021-02-07 NOTE — PATIENT PROFILE ADULT - STATED REASON FOR ADMISSION
" Because my blood oxygen has been going up and down the last few day an this morning it went down to 86%

## 2021-02-08 LAB
ALBUMIN SERPL ELPH-MCNC: 3.2 G/DL — LOW (ref 3.3–5)
ALP SERPL-CCNC: 67 U/L — SIGNIFICANT CHANGE UP (ref 40–120)
ALT FLD-CCNC: 67 U/L — HIGH (ref 4–41)
ANION GAP SERPL CALC-SCNC: 16 MMOL/L — HIGH (ref 7–14)
AST SERPL-CCNC: 105 U/L — HIGH (ref 4–40)
BILIRUB DIRECT SERPL-MCNC: 0.5 MG/DL — HIGH (ref 0–0.2)
BILIRUB INDIRECT FLD-MCNC: 0.6 MG/DL — SIGNIFICANT CHANGE UP (ref 0–1)
BILIRUB SERPL-MCNC: 1.1 MG/DL — SIGNIFICANT CHANGE UP (ref 0.2–1.2)
BUN SERPL-MCNC: 36 MG/DL — HIGH (ref 7–23)
CALCIUM SERPL-MCNC: 8.7 MG/DL — SIGNIFICANT CHANGE UP (ref 8.4–10.5)
CHLORIDE SERPL-SCNC: 96 MMOL/L — LOW (ref 98–107)
CO2 SERPL-SCNC: 27 MMOL/L — SIGNIFICANT CHANGE UP (ref 22–31)
CREAT SERPL-MCNC: 1.01 MG/DL — SIGNIFICANT CHANGE UP (ref 0.5–1.3)
CREAT SERPL-MCNC: 1.04 MG/DL — SIGNIFICANT CHANGE UP (ref 0.5–1.3)
GLUCOSE SERPL-MCNC: 129 MG/DL — HIGH (ref 70–99)
PHOSPHATE SERPL-MCNC: 2.5 MG/DL — SIGNIFICANT CHANGE UP (ref 2.5–4.5)
POTASSIUM SERPL-MCNC: 3.8 MMOL/L — SIGNIFICANT CHANGE UP (ref 3.5–5.3)
POTASSIUM SERPL-SCNC: 3.8 MMOL/L — SIGNIFICANT CHANGE UP (ref 3.5–5.3)
PROT SERPL-MCNC: 7 G/DL — SIGNIFICANT CHANGE UP (ref 6–8.3)
SODIUM SERPL-SCNC: 139 MMOL/L — SIGNIFICANT CHANGE UP (ref 135–145)

## 2021-02-08 PROCEDURE — 99232 SBSQ HOSP IP/OBS MODERATE 35: CPT

## 2021-02-08 RX ORDER — SODIUM CHLORIDE 0.65 %
1 AEROSOL, SPRAY (ML) NASAL
Refills: 0 | Status: DISCONTINUED | OUTPATIENT
Start: 2021-02-08 | End: 2021-02-15

## 2021-02-08 RX ORDER — AZITHROMYCIN 500 MG/1
500 TABLET, FILM COATED ORAL ONCE
Refills: 0 | Status: COMPLETED | OUTPATIENT
Start: 2021-02-08 | End: 2021-02-08

## 2021-02-08 RX ORDER — AZITHROMYCIN 500 MG/1
500 TABLET, FILM COATED ORAL EVERY 24 HOURS
Refills: 0 | Status: DISCONTINUED | OUTPATIENT
Start: 2021-02-09 | End: 2021-02-13

## 2021-02-08 RX ORDER — CEFTRIAXONE 500 MG/1
1000 INJECTION, POWDER, FOR SOLUTION INTRAMUSCULAR; INTRAVENOUS EVERY 24 HOURS
Refills: 0 | Status: COMPLETED | OUTPATIENT
Start: 2021-02-08 | End: 2021-02-12

## 2021-02-08 RX ORDER — LORATADINE 10 MG/1
10 TABLET ORAL DAILY
Refills: 0 | Status: DISCONTINUED | OUTPATIENT
Start: 2021-02-08 | End: 2021-02-15

## 2021-02-08 RX ORDER — SODIUM CHLORIDE 0.65 %
1 AEROSOL, SPRAY (ML) NASAL EVERY 4 HOURS
Refills: 0 | Status: DISCONTINUED | OUTPATIENT
Start: 2021-02-08 | End: 2021-02-15

## 2021-02-08 RX ORDER — AZITHROMYCIN 500 MG/1
TABLET, FILM COATED ORAL
Refills: 0 | Status: DISCONTINUED | OUTPATIENT
Start: 2021-02-08 | End: 2021-02-13

## 2021-02-08 RX ADMIN — ENOXAPARIN SODIUM 40 MILLIGRAM(S): 100 INJECTION SUBCUTANEOUS at 17:48

## 2021-02-08 RX ADMIN — Medication 100 MILLIGRAM(S): at 04:41

## 2021-02-08 RX ADMIN — Medication 2 SPRAY(S): at 17:48

## 2021-02-08 RX ADMIN — Medication 100 MILLIGRAM(S): at 22:22

## 2021-02-08 RX ADMIN — Medication 1 SPRAY(S): at 22:22

## 2021-02-08 RX ADMIN — Medication 100 MILLIGRAM(S): at 13:19

## 2021-02-08 RX ADMIN — REMDESIVIR 500 MILLIGRAM(S): 5 INJECTION INTRAVENOUS at 22:21

## 2021-02-08 RX ADMIN — AZITHROMYCIN 255 MILLIGRAM(S): 500 TABLET, FILM COATED ORAL at 10:42

## 2021-02-08 RX ADMIN — CEFTRIAXONE 100 MILLIGRAM(S): 500 INJECTION, POWDER, FOR SOLUTION INTRAMUSCULAR; INTRAVENOUS at 11:57

## 2021-02-08 RX ADMIN — Medication 6 MILLIGRAM(S): at 04:41

## 2021-02-08 RX ADMIN — Medication 9 MILLIGRAM(S): at 22:22

## 2021-02-08 RX ADMIN — Medication 2 SPRAY(S): at 04:42

## 2021-02-08 RX ADMIN — LORATADINE 10 MILLIGRAM(S): 10 TABLET ORAL at 22:22

## 2021-02-08 RX ADMIN — ENOXAPARIN SODIUM 40 MILLIGRAM(S): 100 INJECTION SUBCUTANEOUS at 04:42

## 2021-02-08 NOTE — PROGRESS NOTE ADULT - ASSESSMENT
59 y/o male diagnosed with COVID-19 a week ago, came in for SOB. Found to be hypoxic on RA due to Covid-19 pneumonia.         acute respiratory failure with hypoxia due to covid -19 pneumonia  on 6 L/min  nasal  congestion     plan:  continue decadron and remdesivir   continue supplemental O2  add nasal spray , antihistamine  61 y/o male diagnosed with COVID-19 a week ago, came in for SOB. Found to be hypoxic on RA due to Covid-19 pneumonia.         acute respiratory failure with hypoxia due to covid -19 pneumonia  on 6 L/min  super imposed CAP  nasal  congestion     plan:  continue decadron and remdesivir  continue IV azithromycin, rocephin   continue supplemental O2  add nasal spray , antihistamine

## 2021-02-09 LAB
ALBUMIN SERPL ELPH-MCNC: 2.9 G/DL — LOW (ref 3.3–5)
ALBUMIN SERPL ELPH-MCNC: 2.9 G/DL — LOW (ref 3.3–5)
ALP SERPL-CCNC: 68 U/L — SIGNIFICANT CHANGE UP (ref 40–120)
ALP SERPL-CCNC: 68 U/L — SIGNIFICANT CHANGE UP (ref 40–120)
ALT FLD-CCNC: 101 U/L — HIGH (ref 4–41)
ALT FLD-CCNC: 102 U/L — HIGH (ref 4–41)
ANION GAP SERPL CALC-SCNC: 10 MMOL/L — SIGNIFICANT CHANGE UP (ref 7–14)
AST SERPL-CCNC: 132 U/L — HIGH (ref 4–40)
AST SERPL-CCNC: 133 U/L — HIGH (ref 4–40)
BILIRUB DIRECT SERPL-MCNC: 0.4 MG/DL — HIGH (ref 0–0.2)
BILIRUB DIRECT SERPL-MCNC: 0.5 MG/DL — HIGH (ref 0–0.2)
BILIRUB INDIRECT FLD-MCNC: 0.5 MG/DL — SIGNIFICANT CHANGE UP (ref 0–1)
BILIRUB INDIRECT FLD-MCNC: 0.7 MG/DL — SIGNIFICANT CHANGE UP (ref 0–1)
BILIRUB SERPL-MCNC: 1 MG/DL — SIGNIFICANT CHANGE UP (ref 0.2–1.2)
BILIRUB SERPL-MCNC: 1.1 MG/DL — SIGNIFICANT CHANGE UP (ref 0.2–1.2)
BUN SERPL-MCNC: 29 MG/DL — HIGH (ref 7–23)
CALCIUM SERPL-MCNC: 8.4 MG/DL — SIGNIFICANT CHANGE UP (ref 8.4–10.5)
CHLORIDE SERPL-SCNC: 96 MMOL/L — LOW (ref 98–107)
CO2 SERPL-SCNC: 28 MMOL/L — SIGNIFICANT CHANGE UP (ref 22–31)
CREAT SERPL-MCNC: 0.72 MG/DL — SIGNIFICANT CHANGE UP (ref 0.5–1.3)
CREAT SERPL-MCNC: 0.72 MG/DL — SIGNIFICANT CHANGE UP (ref 0.5–1.3)
CRP SERPL-MCNC: 50 MG/L — HIGH
D DIMER BLD IA.RAPID-MCNC: 352 NG/ML DDU — HIGH
GLUCOSE SERPL-MCNC: 161 MG/DL — HIGH (ref 70–99)
HCT VFR BLD CALC: 40.9 % — SIGNIFICANT CHANGE UP (ref 39–50)
HGB BLD-MCNC: 13.5 G/DL — SIGNIFICANT CHANGE UP (ref 13–17)
LDH SERPL L TO P-CCNC: 716 U/L — HIGH (ref 135–225)
MAGNESIUM SERPL-MCNC: 2.7 MG/DL — HIGH (ref 1.6–2.6)
MCHC RBC-ENTMCNC: 27.3 PG — SIGNIFICANT CHANGE UP (ref 27–34)
MCHC RBC-ENTMCNC: 33 GM/DL — SIGNIFICANT CHANGE UP (ref 32–36)
MCV RBC AUTO: 82.8 FL — SIGNIFICANT CHANGE UP (ref 80–100)
NRBC # BLD: 0 /100 WBCS — SIGNIFICANT CHANGE UP
NRBC # FLD: 0 K/UL — SIGNIFICANT CHANGE UP
PHOSPHATE SERPL-MCNC: 3.4 MG/DL — SIGNIFICANT CHANGE UP (ref 2.5–4.5)
PLATELET # BLD AUTO: 255 K/UL — SIGNIFICANT CHANGE UP (ref 150–400)
POTASSIUM SERPL-MCNC: 3.7 MMOL/L — SIGNIFICANT CHANGE UP (ref 3.5–5.3)
POTASSIUM SERPL-SCNC: 3.7 MMOL/L — SIGNIFICANT CHANGE UP (ref 3.5–5.3)
PROT SERPL-MCNC: 6.3 G/DL — SIGNIFICANT CHANGE UP (ref 6–8.3)
PROT SERPL-MCNC: 6.3 G/DL — SIGNIFICANT CHANGE UP (ref 6–8.3)
RBC # BLD: 4.94 M/UL — SIGNIFICANT CHANGE UP (ref 4.2–5.8)
RBC # FLD: 13.2 % — SIGNIFICANT CHANGE UP (ref 10.3–14.5)
SODIUM SERPL-SCNC: 134 MMOL/L — LOW (ref 135–145)
WBC # BLD: 4.94 K/UL — SIGNIFICANT CHANGE UP (ref 3.8–10.5)
WBC # FLD AUTO: 4.94 K/UL — SIGNIFICANT CHANGE UP (ref 3.8–10.5)

## 2021-02-09 PROCEDURE — 99232 SBSQ HOSP IP/OBS MODERATE 35: CPT

## 2021-02-09 RX ADMIN — Medication 1 SPRAY(S): at 17:46

## 2021-02-09 RX ADMIN — Medication 1 SPRAY(S): at 06:39

## 2021-02-09 RX ADMIN — CEFTRIAXONE 100 MILLIGRAM(S): 500 INJECTION, POWDER, FOR SOLUTION INTRAMUSCULAR; INTRAVENOUS at 11:48

## 2021-02-09 RX ADMIN — REMDESIVIR 500 MILLIGRAM(S): 5 INJECTION INTRAVENOUS at 21:32

## 2021-02-09 RX ADMIN — Medication 100 MILLIGRAM(S): at 13:56

## 2021-02-09 RX ADMIN — Medication 2 SPRAY(S): at 06:39

## 2021-02-09 RX ADMIN — Medication 9 MILLIGRAM(S): at 21:32

## 2021-02-09 RX ADMIN — LORATADINE 10 MILLIGRAM(S): 10 TABLET ORAL at 13:56

## 2021-02-09 RX ADMIN — Medication 6 MILLIGRAM(S): at 06:39

## 2021-02-09 RX ADMIN — ENOXAPARIN SODIUM 40 MILLIGRAM(S): 100 INJECTION SUBCUTANEOUS at 17:47

## 2021-02-09 RX ADMIN — ENOXAPARIN SODIUM 40 MILLIGRAM(S): 100 INJECTION SUBCUTANEOUS at 06:39

## 2021-02-09 RX ADMIN — Medication 100 MILLIGRAM(S): at 21:33

## 2021-02-09 RX ADMIN — Medication 100 MILLIGRAM(S): at 06:39

## 2021-02-09 RX ADMIN — Medication 2 SPRAY(S): at 17:46

## 2021-02-09 RX ADMIN — AZITHROMYCIN 255 MILLIGRAM(S): 500 TABLET, FILM COATED ORAL at 11:34

## 2021-02-09 NOTE — PROGRESS NOTE ADULT - ASSESSMENT
59 y/o male diagnosed with COVID-19 a week ago, came in for SOB. Found to be hypoxic on RA due to Covid-19 pneumonia.         acute respiratory failure with hypoxia due to covid -19 pneumonia  on 6 L/min  super imposed CAP  nasal  congestion     stable , improving a little on 5L/min , saturation 93%     plan:  wean O2 as tolerated   continue decadron and remdesivir  continue IV azithromycin, rocephin   continue supplemental O2  incentive spirometry

## 2021-02-10 LAB
ALBUMIN SERPL ELPH-MCNC: 3 G/DL — LOW (ref 3.3–5)
ALP SERPL-CCNC: 81 U/L — SIGNIFICANT CHANGE UP (ref 40–120)
ALT FLD-CCNC: 134 U/L — HIGH (ref 4–41)
ANION GAP SERPL CALC-SCNC: 10 MMOL/L — SIGNIFICANT CHANGE UP (ref 7–14)
AST SERPL-CCNC: 128 U/L — HIGH (ref 4–40)
BILIRUB DIRECT SERPL-MCNC: 0.5 MG/DL — HIGH (ref 0–0.2)
BILIRUB INDIRECT FLD-MCNC: 0.5 MG/DL — SIGNIFICANT CHANGE UP (ref 0–1)
BILIRUB SERPL-MCNC: 1 MG/DL — SIGNIFICANT CHANGE UP (ref 0.2–1.2)
BUN SERPL-MCNC: 30 MG/DL — HIGH (ref 7–23)
CALCIUM SERPL-MCNC: 8.7 MG/DL — SIGNIFICANT CHANGE UP (ref 8.4–10.5)
CHLORIDE SERPL-SCNC: 97 MMOL/L — LOW (ref 98–107)
CO2 SERPL-SCNC: 28 MMOL/L — SIGNIFICANT CHANGE UP (ref 22–31)
CREAT SERPL-MCNC: 0.66 MG/DL — SIGNIFICANT CHANGE UP (ref 0.5–1.3)
CREAT SERPL-MCNC: 0.66 MG/DL — SIGNIFICANT CHANGE UP (ref 0.5–1.3)
FERRITIN SERPL-MCNC: 3841 NG/ML — HIGH (ref 30–400)
GLUCOSE SERPL-MCNC: 172 MG/DL — HIGH (ref 70–99)
HCT VFR BLD CALC: 42.6 % — SIGNIFICANT CHANGE UP (ref 39–50)
HGB BLD-MCNC: 14.1 G/DL — SIGNIFICANT CHANGE UP (ref 13–17)
MAGNESIUM SERPL-MCNC: 2.5 MG/DL — SIGNIFICANT CHANGE UP (ref 1.6–2.6)
MCHC RBC-ENTMCNC: 27.6 PG — SIGNIFICANT CHANGE UP (ref 27–34)
MCHC RBC-ENTMCNC: 33.1 GM/DL — SIGNIFICANT CHANGE UP (ref 32–36)
MCV RBC AUTO: 83.4 FL — SIGNIFICANT CHANGE UP (ref 80–100)
NRBC # BLD: 0 /100 WBCS — SIGNIFICANT CHANGE UP
NRBC # FLD: 0 K/UL — SIGNIFICANT CHANGE UP
PHOSPHATE SERPL-MCNC: 3.2 MG/DL — SIGNIFICANT CHANGE UP (ref 2.5–4.5)
PLATELET # BLD AUTO: 303 K/UL — SIGNIFICANT CHANGE UP (ref 150–400)
POTASSIUM SERPL-MCNC: 3.8 MMOL/L — SIGNIFICANT CHANGE UP (ref 3.5–5.3)
POTASSIUM SERPL-SCNC: 3.8 MMOL/L — SIGNIFICANT CHANGE UP (ref 3.5–5.3)
PROT SERPL-MCNC: 6.4 G/DL — SIGNIFICANT CHANGE UP (ref 6–8.3)
RBC # BLD: 5.11 M/UL — SIGNIFICANT CHANGE UP (ref 4.2–5.8)
RBC # FLD: 12.9 % — SIGNIFICANT CHANGE UP (ref 10.3–14.5)
SODIUM SERPL-SCNC: 135 MMOL/L — SIGNIFICANT CHANGE UP (ref 135–145)
WBC # BLD: 7.02 K/UL — SIGNIFICANT CHANGE UP (ref 3.8–10.5)
WBC # FLD AUTO: 7.02 K/UL — SIGNIFICANT CHANGE UP (ref 3.8–10.5)

## 2021-02-10 PROCEDURE — 99232 SBSQ HOSP IP/OBS MODERATE 35: CPT

## 2021-02-10 RX ORDER — SIMETHICONE 80 MG/1
80 TABLET, CHEWABLE ORAL EVERY 4 HOURS
Refills: 0 | Status: DISCONTINUED | OUTPATIENT
Start: 2021-02-10 | End: 2021-02-15

## 2021-02-10 RX ORDER — SIMETHICONE 80 MG/1
80 TABLET, CHEWABLE ORAL ONCE
Refills: 0 | Status: COMPLETED | OUTPATIENT
Start: 2021-02-10 | End: 2021-02-10

## 2021-02-10 RX ADMIN — Medication 6 MILLIGRAM(S): at 04:44

## 2021-02-10 RX ADMIN — Medication 2 SPRAY(S): at 04:44

## 2021-02-10 RX ADMIN — REMDESIVIR 500 MILLIGRAM(S): 5 INJECTION INTRAVENOUS at 21:44

## 2021-02-10 RX ADMIN — Medication 1 SPRAY(S): at 18:29

## 2021-02-10 RX ADMIN — Medication 2 SPRAY(S): at 18:30

## 2021-02-10 RX ADMIN — Medication 100 MILLIGRAM(S): at 14:00

## 2021-02-10 RX ADMIN — AZITHROMYCIN 255 MILLIGRAM(S): 500 TABLET, FILM COATED ORAL at 10:46

## 2021-02-10 RX ADMIN — CEFTRIAXONE 100 MILLIGRAM(S): 500 INJECTION, POWDER, FOR SOLUTION INTRAMUSCULAR; INTRAVENOUS at 11:41

## 2021-02-10 RX ADMIN — Medication 100 MILLIGRAM(S): at 04:44

## 2021-02-10 RX ADMIN — ENOXAPARIN SODIUM 40 MILLIGRAM(S): 100 INJECTION SUBCUTANEOUS at 18:29

## 2021-02-10 RX ADMIN — SIMETHICONE 80 MILLIGRAM(S): 80 TABLET, CHEWABLE ORAL at 18:29

## 2021-02-10 RX ADMIN — Medication 1 SPRAY(S): at 04:51

## 2021-02-10 RX ADMIN — Medication 9 MILLIGRAM(S): at 21:44

## 2021-02-10 RX ADMIN — Medication 100 MILLIGRAM(S): at 21:44

## 2021-02-10 RX ADMIN — LORATADINE 10 MILLIGRAM(S): 10 TABLET ORAL at 14:00

## 2021-02-10 RX ADMIN — ENOXAPARIN SODIUM 40 MILLIGRAM(S): 100 INJECTION SUBCUTANEOUS at 04:44

## 2021-02-10 NOTE — PROGRESS NOTE ADULT - ASSESSMENT
61 y/o male diagnosed with COVID-19 a week ago, came in for SOB. Found to be hypoxic on RA due to Covid-19 pneumonia.     acute respiratory failure with hypoxia due to covid -19 pneumonia  on 6 L/min  super imposed CAP  nasal  congestion     stable , improving a little on 5L/min , saturation 93%     plan:  wean O2 as tolerated   continue decadron and remdesivir  continue IV azithromycin, rocephin   continue supplemental O2  incentive spirometry  59 y/o male diagnosed with COVID-19 a week ago, came in for SOB. Found to be hypoxic on RA due to Covid-19 pneumonia.     acute respiratory failure with hypoxia due to covid -19 pneumonia  on 6 L/min  super imposed CAP  yulia resolved 39-1.2 on admission 30-0.7 bun cr  nasal  congestion     stable , improving a little on 5L/min , saturation 93%     plan:  wean O2 as tolerated   continue decadron and remdesivir  continue IV azithromycin, rocephin   continue supplemental O2  incentive spirometry

## 2021-02-11 LAB
ALBUMIN SERPL ELPH-MCNC: 3 G/DL — LOW (ref 3.3–5)
ALBUMIN SERPL ELPH-MCNC: 3 G/DL — LOW (ref 3.3–5)
ALP SERPL-CCNC: 80 U/L — SIGNIFICANT CHANGE UP (ref 40–120)
ALP SERPL-CCNC: 80 U/L — SIGNIFICANT CHANGE UP (ref 40–120)
ALT FLD-CCNC: 144 U/L — HIGH (ref 4–41)
ALT FLD-CCNC: 144 U/L — HIGH (ref 4–41)
ANION GAP SERPL CALC-SCNC: 11 MMOL/L — SIGNIFICANT CHANGE UP (ref 7–14)
AST SERPL-CCNC: 91 U/L — HIGH (ref 4–40)
AST SERPL-CCNC: 92 U/L — HIGH (ref 4–40)
BILIRUB DIRECT SERPL-MCNC: 0.3 MG/DL — HIGH (ref 0–0.2)
BILIRUB DIRECT SERPL-MCNC: 0.3 MG/DL — HIGH (ref 0–0.2)
BILIRUB INDIRECT FLD-MCNC: 0.5 MG/DL — SIGNIFICANT CHANGE UP (ref 0–1)
BILIRUB INDIRECT FLD-MCNC: 0.6 MG/DL — SIGNIFICANT CHANGE UP (ref 0–1)
BILIRUB SERPL-MCNC: 0.8 MG/DL — SIGNIFICANT CHANGE UP (ref 0.2–1.2)
BILIRUB SERPL-MCNC: 0.9 MG/DL — SIGNIFICANT CHANGE UP (ref 0.2–1.2)
BUN SERPL-MCNC: 25 MG/DL — HIGH (ref 7–23)
CALCIUM SERPL-MCNC: 8.5 MG/DL — SIGNIFICANT CHANGE UP (ref 8.4–10.5)
CHLORIDE SERPL-SCNC: 97 MMOL/L — LOW (ref 98–107)
CO2 SERPL-SCNC: 27 MMOL/L — SIGNIFICANT CHANGE UP (ref 22–31)
CREAT SERPL-MCNC: 0.68 MG/DL — SIGNIFICANT CHANGE UP (ref 0.5–1.3)
CREAT SERPL-MCNC: 0.68 MG/DL — SIGNIFICANT CHANGE UP (ref 0.5–1.3)
GLUCOSE SERPL-MCNC: 150 MG/DL — HIGH (ref 70–99)
HCT VFR BLD CALC: 41.9 % — SIGNIFICANT CHANGE UP (ref 39–50)
HGB BLD-MCNC: 14.2 G/DL — SIGNIFICANT CHANGE UP (ref 13–17)
MAGNESIUM SERPL-MCNC: 2.5 MG/DL — SIGNIFICANT CHANGE UP (ref 1.6–2.6)
MCHC RBC-ENTMCNC: 27.8 PG — SIGNIFICANT CHANGE UP (ref 27–34)
MCHC RBC-ENTMCNC: 33.9 GM/DL — SIGNIFICANT CHANGE UP (ref 32–36)
MCV RBC AUTO: 82.2 FL — SIGNIFICANT CHANGE UP (ref 80–100)
NRBC # BLD: 0 /100 WBCS — SIGNIFICANT CHANGE UP
NRBC # FLD: 0 K/UL — SIGNIFICANT CHANGE UP
PHOSPHATE SERPL-MCNC: 3.3 MG/DL — SIGNIFICANT CHANGE UP (ref 2.5–4.5)
PLATELET # BLD AUTO: 346 K/UL — SIGNIFICANT CHANGE UP (ref 150–400)
POTASSIUM SERPL-MCNC: 3.9 MMOL/L — SIGNIFICANT CHANGE UP (ref 3.5–5.3)
POTASSIUM SERPL-SCNC: 3.9 MMOL/L — SIGNIFICANT CHANGE UP (ref 3.5–5.3)
PROT SERPL-MCNC: 6.2 G/DL — SIGNIFICANT CHANGE UP (ref 6–8.3)
PROT SERPL-MCNC: 6.2 G/DL — SIGNIFICANT CHANGE UP (ref 6–8.3)
RBC # BLD: 5.1 M/UL — SIGNIFICANT CHANGE UP (ref 4.2–5.8)
RBC # FLD: 12.8 % — SIGNIFICANT CHANGE UP (ref 10.3–14.5)
SODIUM SERPL-SCNC: 135 MMOL/L — SIGNIFICANT CHANGE UP (ref 135–145)
WBC # BLD: 9.86 K/UL — SIGNIFICANT CHANGE UP (ref 3.8–10.5)
WBC # FLD AUTO: 9.86 K/UL — SIGNIFICANT CHANGE UP (ref 3.8–10.5)

## 2021-02-11 PROCEDURE — 99232 SBSQ HOSP IP/OBS MODERATE 35: CPT

## 2021-02-11 RX ADMIN — Medication 1 SPRAY(S): at 18:09

## 2021-02-11 RX ADMIN — REMDESIVIR 500 MILLIGRAM(S): 5 INJECTION INTRAVENOUS at 22:33

## 2021-02-11 RX ADMIN — Medication 1 SPRAY(S): at 05:05

## 2021-02-11 RX ADMIN — AZITHROMYCIN 255 MILLIGRAM(S): 500 TABLET, FILM COATED ORAL at 11:05

## 2021-02-11 RX ADMIN — LORATADINE 10 MILLIGRAM(S): 10 TABLET ORAL at 13:12

## 2021-02-11 RX ADMIN — Medication 6 MILLIGRAM(S): at 05:04

## 2021-02-11 RX ADMIN — Medication 9 MILLIGRAM(S): at 22:33

## 2021-02-11 RX ADMIN — Medication 2 SPRAY(S): at 05:05

## 2021-02-11 RX ADMIN — CEFTRIAXONE 100 MILLIGRAM(S): 500 INJECTION, POWDER, FOR SOLUTION INTRAMUSCULAR; INTRAVENOUS at 13:12

## 2021-02-11 RX ADMIN — Medication 2 SPRAY(S): at 18:09

## 2021-02-11 RX ADMIN — Medication 1 SPRAY(S): at 18:10

## 2021-02-11 RX ADMIN — Medication 100 MILLIGRAM(S): at 05:04

## 2021-02-11 RX ADMIN — Medication 100 MILLIGRAM(S): at 22:33

## 2021-02-11 RX ADMIN — ENOXAPARIN SODIUM 40 MILLIGRAM(S): 100 INJECTION SUBCUTANEOUS at 05:04

## 2021-02-11 RX ADMIN — Medication 100 MILLIGRAM(S): at 13:13

## 2021-02-11 RX ADMIN — ENOXAPARIN SODIUM 40 MILLIGRAM(S): 100 INJECTION SUBCUTANEOUS at 18:10

## 2021-02-11 NOTE — PROGRESS NOTE ADULT - ASSESSMENT
61 y/o male diagnosed with COVID-19 a week ago, came in for SOB. Found to be hypoxic on RA due to Covid-19 pneumonia.     acute respiratory failure with hypoxia due to covid -19 pneumonia  on 6 L/min  super imposed CAP  yulia resolved 39-1.2 on admission 25-0.68 bun cr  nasal  congestion     stable , improving a little on 5-L/min , saturation 93-94%     plan:  wean O2 as tolerated   continue decadron and remdesivir  continue IV azithromycin, rocephin   continue supplemental O2  incentive spirometry

## 2021-02-12 LAB
ALBUMIN SERPL ELPH-MCNC: 2.8 G/DL — LOW (ref 3.3–5)
ALBUMIN SERPL ELPH-MCNC: 2.8 G/DL — LOW (ref 3.3–5)
ALP SERPL-CCNC: 73 U/L — SIGNIFICANT CHANGE UP (ref 40–120)
ALP SERPL-CCNC: 74 U/L — SIGNIFICANT CHANGE UP (ref 40–120)
ALT FLD-CCNC: 134 U/L — HIGH (ref 4–41)
ALT FLD-CCNC: 136 U/L — HIGH (ref 4–41)
ANION GAP SERPL CALC-SCNC: 10 MMOL/L — SIGNIFICANT CHANGE UP (ref 7–14)
ANISOCYTOSIS BLD QL: SLIGHT — SIGNIFICANT CHANGE UP
AST SERPL-CCNC: 72 U/L — HIGH (ref 4–40)
AST SERPL-CCNC: 76 U/L — HIGH (ref 4–40)
BASOPHILS # BLD AUTO: 0 K/UL — SIGNIFICANT CHANGE UP (ref 0–0.2)
BASOPHILS NFR BLD AUTO: 0 % — SIGNIFICANT CHANGE UP (ref 0–2)
BILIRUB DIRECT SERPL-MCNC: 0.2 MG/DL — SIGNIFICANT CHANGE UP (ref 0–0.2)
BILIRUB INDIRECT FLD-MCNC: 0.4 MG/DL — SIGNIFICANT CHANGE UP (ref 0–1)
BILIRUB SERPL-MCNC: 0.5 MG/DL — SIGNIFICANT CHANGE UP (ref 0.2–1.2)
BILIRUB SERPL-MCNC: 0.6 MG/DL — SIGNIFICANT CHANGE UP (ref 0.2–1.2)
BUN SERPL-MCNC: 25 MG/DL — HIGH (ref 7–23)
CALCIUM SERPL-MCNC: 8.4 MG/DL — SIGNIFICANT CHANGE UP (ref 8.4–10.5)
CHLORIDE SERPL-SCNC: 102 MMOL/L — SIGNIFICANT CHANGE UP (ref 98–107)
CO2 SERPL-SCNC: 24 MMOL/L — SIGNIFICANT CHANGE UP (ref 22–31)
CREAT SERPL-MCNC: 0.71 MG/DL — SIGNIFICANT CHANGE UP (ref 0.5–1.3)
CREAT SERPL-MCNC: 0.72 MG/DL — SIGNIFICANT CHANGE UP (ref 0.5–1.3)
CRP SERPL-MCNC: 9.6 MG/L — HIGH
D DIMER BLD IA.RAPID-MCNC: 181 NG/ML DDU — HIGH
EOSINOPHIL # BLD AUTO: 0 K/UL — SIGNIFICANT CHANGE UP (ref 0–0.5)
EOSINOPHIL NFR BLD AUTO: 0 % — SIGNIFICANT CHANGE UP (ref 0–6)
FERRITIN SERPL-MCNC: 1802 NG/ML — HIGH (ref 30–400)
GIANT PLATELETS BLD QL SMEAR: PRESENT — SIGNIFICANT CHANGE UP
GLUCOSE SERPL-MCNC: 127 MG/DL — HIGH (ref 70–99)
HCT VFR BLD CALC: 41.6 % — SIGNIFICANT CHANGE UP (ref 39–50)
HGB BLD-MCNC: 13.4 G/DL — SIGNIFICANT CHANGE UP (ref 13–17)
IANC: 7.6 K/UL — SIGNIFICANT CHANGE UP (ref 1.5–8.5)
LDH SERPL L TO P-CCNC: 441 U/L — HIGH (ref 135–225)
LYMPHOCYTES # BLD AUTO: 1.23 K/UL — SIGNIFICANT CHANGE UP (ref 1–3.3)
LYMPHOCYTES # BLD AUTO: 10.4 % — LOW (ref 13–44)
MAGNESIUM SERPL-MCNC: 2.3 MG/DL — SIGNIFICANT CHANGE UP (ref 1.6–2.6)
MANUAL SMEAR VERIFICATION: SIGNIFICANT CHANGE UP
MCHC RBC-ENTMCNC: 27.4 PG — SIGNIFICANT CHANGE UP (ref 27–34)
MCHC RBC-ENTMCNC: 32.2 GM/DL — SIGNIFICANT CHANGE UP (ref 32–36)
MCV RBC AUTO: 85.1 FL — SIGNIFICANT CHANGE UP (ref 80–100)
MONOCYTES # BLD AUTO: 1.13 K/UL — HIGH (ref 0–0.9)
MONOCYTES NFR BLD AUTO: 9.6 % — SIGNIFICANT CHANGE UP (ref 2–14)
NEUTROPHILS # BLD AUTO: 9.02 K/UL — HIGH (ref 1.8–7.4)
NEUTROPHILS NFR BLD AUTO: 75.6 % — SIGNIFICANT CHANGE UP (ref 43–77)
NEUTS BAND # BLD: 0.9 % — SIGNIFICANT CHANGE UP (ref 0–6)
PLAT MORPH BLD: NORMAL — SIGNIFICANT CHANGE UP
PLATELET # BLD AUTO: 337 K/UL — SIGNIFICANT CHANGE UP (ref 150–400)
PLATELET COUNT - ESTIMATE: NORMAL — SIGNIFICANT CHANGE UP
POLYCHROMASIA BLD QL SMEAR: SLIGHT — SIGNIFICANT CHANGE UP
POTASSIUM SERPL-MCNC: 4 MMOL/L — SIGNIFICANT CHANGE UP (ref 3.5–5.3)
POTASSIUM SERPL-SCNC: 4 MMOL/L — SIGNIFICANT CHANGE UP (ref 3.5–5.3)
PROCALCITONIN SERPL-MCNC: 0.15 NG/ML — HIGH (ref 0.02–0.1)
PROT SERPL-MCNC: 5.9 G/DL — LOW (ref 6–8.3)
PROT SERPL-MCNC: 6 G/DL — SIGNIFICANT CHANGE UP (ref 6–8.3)
RBC # BLD: 4.89 M/UL — SIGNIFICANT CHANGE UP (ref 4.2–5.8)
RBC # FLD: 12.9 % — SIGNIFICANT CHANGE UP (ref 10.3–14.5)
RBC BLD AUTO: NORMAL — SIGNIFICANT CHANGE UP
SMUDGE CELLS # BLD: PRESENT — SIGNIFICANT CHANGE UP
SODIUM SERPL-SCNC: 136 MMOL/L — SIGNIFICANT CHANGE UP (ref 135–145)
VARIANT LYMPHS # BLD: 3.5 % — SIGNIFICANT CHANGE UP (ref 0–6)
WBC # BLD: 11.79 K/UL — HIGH (ref 3.8–10.5)
WBC # FLD AUTO: 11.79 K/UL — HIGH (ref 3.8–10.5)

## 2021-02-12 PROCEDURE — 99232 SBSQ HOSP IP/OBS MODERATE 35: CPT

## 2021-02-12 RX ADMIN — CEFTRIAXONE 100 MILLIGRAM(S): 500 INJECTION, POWDER, FOR SOLUTION INTRAMUSCULAR; INTRAVENOUS at 11:43

## 2021-02-12 RX ADMIN — ENOXAPARIN SODIUM 40 MILLIGRAM(S): 100 INJECTION SUBCUTANEOUS at 06:08

## 2021-02-12 RX ADMIN — Medication 9 MILLIGRAM(S): at 23:11

## 2021-02-12 RX ADMIN — ENOXAPARIN SODIUM 40 MILLIGRAM(S): 100 INJECTION SUBCUTANEOUS at 17:18

## 2021-02-12 RX ADMIN — Medication 6 MILLIGRAM(S): at 06:08

## 2021-02-12 RX ADMIN — LORATADINE 10 MILLIGRAM(S): 10 TABLET ORAL at 11:48

## 2021-02-12 RX ADMIN — AZITHROMYCIN 255 MILLIGRAM(S): 500 TABLET, FILM COATED ORAL at 10:45

## 2021-02-12 RX ADMIN — Medication 100 MILLIGRAM(S): at 13:13

## 2021-02-12 RX ADMIN — Medication 100 MILLIGRAM(S): at 06:08

## 2021-02-12 RX ADMIN — Medication 1 SPRAY(S): at 06:08

## 2021-02-12 RX ADMIN — Medication 2 SPRAY(S): at 17:18

## 2021-02-12 RX ADMIN — Medication 2 SPRAY(S): at 06:08

## 2021-02-12 RX ADMIN — Medication 1 SPRAY(S): at 17:18

## 2021-02-12 RX ADMIN — Medication 100 MILLIGRAM(S): at 23:11

## 2021-02-12 NOTE — DIETITIAN INITIAL EVALUATION ADULT. - OTHER INFO
Pt 59 yo male diagnosed with COVID-19 a week ago, came in for SOB; Pt found to be hypoxic likely due to Covid-19 pneumonia - per chart review.     Unable to conduct a face to face interview or nutrition-focused physical exam due to limited contact restrictions related to Pt's medical condition and isolation precautions. Collateral obtained from chart review. RDN tries to reach Pt ([phone]), but no one answered. Unable to assess Pt's PO intake/appetite @ present. No report of chewing or swallowing difficulties; no report of GI distress (nausea/vomiting/diarrhea) @ present. +BM (2/11) per flow sheet.     Unable to discuss diet or weight history at present. RDN remains available.

## 2021-02-12 NOTE — PROGRESS NOTE ADULT - ASSESSMENT
59 y/o male diagnosed with COVID-19 a week ago, came in for SOB. Found to be hypoxic on RA due to Covid-19 pneumonia.     acute respiratory failure with hypoxia due to covid -19 pneumonia  on 4 L/min  super imposed CAP  yulia resolved 39-1.2 on admission 25-0.7 bun cr    stable , improving 4L/min , saturation 94-95%     plan:  wean O2 as tolerated   continue decadron   completed  remdesivir  continue IV azithromycin, rocephin   continue supplemental O2  incentive spirometry

## 2021-02-12 NOTE — PROGRESS NOTE ADULT - PROBLEM SELECTOR PROBLEM 9
Renal/Ureteral Disease

## 2021-02-13 DIAGNOSIS — J18.9 PNEUMONIA, UNSPECIFIED ORGANISM: ICD-10-CM

## 2021-02-13 DIAGNOSIS — Z29.9 ENCOUNTER FOR PROPHYLACTIC MEASURES, UNSPECIFIED: ICD-10-CM

## 2021-02-13 DIAGNOSIS — J96.91 RESPIRATORY FAILURE, UNSPECIFIED WITH HYPOXIA: ICD-10-CM

## 2021-02-13 LAB
ALBUMIN SERPL ELPH-MCNC: 2.8 G/DL — LOW (ref 3.3–5)
ALP SERPL-CCNC: 68 U/L — SIGNIFICANT CHANGE UP (ref 40–120)
ALT FLD-CCNC: 124 U/L — HIGH (ref 4–41)
ANION GAP SERPL CALC-SCNC: 11 MMOL/L — SIGNIFICANT CHANGE UP (ref 7–14)
AST SERPL-CCNC: 57 U/L — HIGH (ref 4–40)
BILIRUB DIRECT SERPL-MCNC: 0.2 MG/DL — SIGNIFICANT CHANGE UP (ref 0–0.2)
BILIRUB INDIRECT FLD-MCNC: 0.4 MG/DL — SIGNIFICANT CHANGE UP (ref 0–1)
BILIRUB SERPL-MCNC: 0.6 MG/DL — SIGNIFICANT CHANGE UP (ref 0.2–1.2)
BUN SERPL-MCNC: 22 MG/DL — SIGNIFICANT CHANGE UP (ref 7–23)
CALCIUM SERPL-MCNC: 8.4 MG/DL — SIGNIFICANT CHANGE UP (ref 8.4–10.5)
CHLORIDE SERPL-SCNC: 103 MMOL/L — SIGNIFICANT CHANGE UP (ref 98–107)
CO2 SERPL-SCNC: 24 MMOL/L — SIGNIFICANT CHANGE UP (ref 22–31)
CREAT SERPL-MCNC: 0.72 MG/DL — SIGNIFICANT CHANGE UP (ref 0.5–1.3)
CREAT SERPL-MCNC: 0.73 MG/DL — SIGNIFICANT CHANGE UP (ref 0.5–1.3)
CULTURE RESULTS: SIGNIFICANT CHANGE UP
GLUCOSE SERPL-MCNC: 123 MG/DL — HIGH (ref 70–99)
HCT VFR BLD CALC: 41.7 % — SIGNIFICANT CHANGE UP (ref 39–50)
HGB BLD-MCNC: 13 G/DL — SIGNIFICANT CHANGE UP (ref 13–17)
MAGNESIUM SERPL-MCNC: 2.3 MG/DL — SIGNIFICANT CHANGE UP (ref 1.6–2.6)
MCHC RBC-ENTMCNC: 26.8 PG — LOW (ref 27–34)
MCHC RBC-ENTMCNC: 31.2 GM/DL — LOW (ref 32–36)
MCV RBC AUTO: 86 FL — SIGNIFICANT CHANGE UP (ref 80–100)
NRBC # BLD: 0 /100 WBCS — SIGNIFICANT CHANGE UP
NRBC # FLD: 0 K/UL — SIGNIFICANT CHANGE UP
PHOSPHATE SERPL-MCNC: 3.5 MG/DL — SIGNIFICANT CHANGE UP (ref 2.5–4.5)
PLATELET # BLD AUTO: 345 K/UL — SIGNIFICANT CHANGE UP (ref 150–400)
POTASSIUM SERPL-MCNC: 4.2 MMOL/L — SIGNIFICANT CHANGE UP (ref 3.5–5.3)
POTASSIUM SERPL-SCNC: 4.2 MMOL/L — SIGNIFICANT CHANGE UP (ref 3.5–5.3)
PROT SERPL-MCNC: 5.7 G/DL — LOW (ref 6–8.3)
RBC # BLD: 4.85 M/UL — SIGNIFICANT CHANGE UP (ref 4.2–5.8)
RBC # FLD: 12.9 % — SIGNIFICANT CHANGE UP (ref 10.3–14.5)
SODIUM SERPL-SCNC: 138 MMOL/L — SIGNIFICANT CHANGE UP (ref 135–145)
SPECIMEN SOURCE: SIGNIFICANT CHANGE UP
WBC # BLD: 13.56 K/UL — HIGH (ref 3.8–10.5)
WBC # FLD AUTO: 13.56 K/UL — HIGH (ref 3.8–10.5)

## 2021-02-13 PROCEDURE — 99231 SBSQ HOSP IP/OBS SF/LOW 25: CPT

## 2021-02-13 RX ADMIN — ENOXAPARIN SODIUM 40 MILLIGRAM(S): 100 INJECTION SUBCUTANEOUS at 06:01

## 2021-02-13 RX ADMIN — Medication 100 MILLIGRAM(S): at 14:07

## 2021-02-13 RX ADMIN — ENOXAPARIN SODIUM 40 MILLIGRAM(S): 100 INJECTION SUBCUTANEOUS at 18:51

## 2021-02-13 RX ADMIN — Medication 2 SPRAY(S): at 06:02

## 2021-02-13 RX ADMIN — Medication 1 SPRAY(S): at 06:02

## 2021-02-13 RX ADMIN — Medication 100 MILLIGRAM(S): at 06:01

## 2021-02-13 RX ADMIN — Medication 100 MILLIGRAM(S): at 23:00

## 2021-02-13 RX ADMIN — Medication 6 MILLIGRAM(S): at 06:01

## 2021-02-13 RX ADMIN — Medication 1 SPRAY(S): at 18:51

## 2021-02-13 RX ADMIN — LORATADINE 10 MILLIGRAM(S): 10 TABLET ORAL at 14:08

## 2021-02-13 RX ADMIN — Medication 2 SPRAY(S): at 18:51

## 2021-02-13 RX ADMIN — AZITHROMYCIN 255 MILLIGRAM(S): 500 TABLET, FILM COATED ORAL at 09:17

## 2021-02-13 RX ADMIN — Medication 9 MILLIGRAM(S): at 23:00

## 2021-02-13 NOTE — PROGRESS NOTE ADULT - ASSESSMENT
59 y/o male diagnosed with COVID-19 a week ago, came in for SOB. Found to be hypoxic on RA due to Covid-19 pneumonia.

## 2021-02-14 LAB
ALBUMIN SERPL ELPH-MCNC: 2.8 G/DL — LOW (ref 3.3–5)
ALP SERPL-CCNC: 64 U/L — SIGNIFICANT CHANGE UP (ref 40–120)
ALT FLD-CCNC: 107 U/L — HIGH (ref 4–41)
ANION GAP SERPL CALC-SCNC: 8 MMOL/L — SIGNIFICANT CHANGE UP (ref 7–14)
AST SERPL-CCNC: 40 U/L — SIGNIFICANT CHANGE UP (ref 4–40)
BILIRUB DIRECT SERPL-MCNC: <0.2 MG/DL — SIGNIFICANT CHANGE UP (ref 0–0.2)
BILIRUB INDIRECT FLD-MCNC: >0.3 MG/DL — SIGNIFICANT CHANGE UP (ref 0–1)
BILIRUB SERPL-MCNC: 0.5 MG/DL — SIGNIFICANT CHANGE UP (ref 0.2–1.2)
BUN SERPL-MCNC: 20 MG/DL — SIGNIFICANT CHANGE UP (ref 7–23)
CALCIUM SERPL-MCNC: 8.6 MG/DL — SIGNIFICANT CHANGE UP (ref 8.4–10.5)
CHLORIDE SERPL-SCNC: 104 MMOL/L — SIGNIFICANT CHANGE UP (ref 98–107)
CO2 SERPL-SCNC: 26 MMOL/L — SIGNIFICANT CHANGE UP (ref 22–31)
CREAT SERPL-MCNC: 0.76 MG/DL — SIGNIFICANT CHANGE UP (ref 0.5–1.3)
CREAT SERPL-MCNC: 0.77 MG/DL — SIGNIFICANT CHANGE UP (ref 0.5–1.3)
GLUCOSE SERPL-MCNC: 129 MG/DL — HIGH (ref 70–99)
HCT VFR BLD CALC: 40.5 % — SIGNIFICANT CHANGE UP (ref 39–50)
HGB BLD-MCNC: 13.2 G/DL — SIGNIFICANT CHANGE UP (ref 13–17)
MAGNESIUM SERPL-MCNC: 2.3 MG/DL — SIGNIFICANT CHANGE UP (ref 1.6–2.6)
MCHC RBC-ENTMCNC: 27.6 PG — SIGNIFICANT CHANGE UP (ref 27–34)
MCHC RBC-ENTMCNC: 32.6 GM/DL — SIGNIFICANT CHANGE UP (ref 32–36)
MCV RBC AUTO: 84.7 FL — SIGNIFICANT CHANGE UP (ref 80–100)
NRBC # BLD: 0 /100 WBCS — SIGNIFICANT CHANGE UP
NRBC # FLD: 0 K/UL — SIGNIFICANT CHANGE UP
PHOSPHATE SERPL-MCNC: 3.8 MG/DL — SIGNIFICANT CHANGE UP (ref 2.5–4.5)
PLATELET # BLD AUTO: 375 K/UL — SIGNIFICANT CHANGE UP (ref 150–400)
POTASSIUM SERPL-MCNC: 4.3 MMOL/L — SIGNIFICANT CHANGE UP (ref 3.5–5.3)
POTASSIUM SERPL-SCNC: 4.3 MMOL/L — SIGNIFICANT CHANGE UP (ref 3.5–5.3)
PROT SERPL-MCNC: 5.8 G/DL — LOW (ref 6–8.3)
RBC # BLD: 4.78 M/UL — SIGNIFICANT CHANGE UP (ref 4.2–5.8)
RBC # FLD: 13.2 % — SIGNIFICANT CHANGE UP (ref 10.3–14.5)
SODIUM SERPL-SCNC: 138 MMOL/L — SIGNIFICANT CHANGE UP (ref 135–145)
WBC # BLD: 17.03 K/UL — HIGH (ref 3.8–10.5)
WBC # FLD AUTO: 17.03 K/UL — HIGH (ref 3.8–10.5)

## 2021-02-14 PROCEDURE — 99231 SBSQ HOSP IP/OBS SF/LOW 25: CPT

## 2021-02-14 RX ADMIN — ENOXAPARIN SODIUM 40 MILLIGRAM(S): 100 INJECTION SUBCUTANEOUS at 05:53

## 2021-02-14 RX ADMIN — Medication 9 MILLIGRAM(S): at 22:00

## 2021-02-14 RX ADMIN — Medication 100 MILLIGRAM(S): at 22:00

## 2021-02-14 RX ADMIN — Medication 6 MILLIGRAM(S): at 05:53

## 2021-02-14 RX ADMIN — Medication 100 MILLIGRAM(S): at 05:53

## 2021-02-14 RX ADMIN — Medication 1 SPRAY(S): at 05:54

## 2021-02-14 RX ADMIN — Medication 2 SPRAY(S): at 05:53

## 2021-02-14 RX ADMIN — LORATADINE 10 MILLIGRAM(S): 10 TABLET ORAL at 14:43

## 2021-02-14 RX ADMIN — Medication 1 SPRAY(S): at 20:03

## 2021-02-14 RX ADMIN — Medication 100 MILLIGRAM(S): at 14:44

## 2021-02-14 RX ADMIN — Medication 2 SPRAY(S): at 20:03

## 2021-02-14 RX ADMIN — ENOXAPARIN SODIUM 40 MILLIGRAM(S): 100 INJECTION SUBCUTANEOUS at 20:03

## 2021-02-14 NOTE — PROGRESS NOTE ADULT - PROBLEM SELECTOR PROBLEM 8
Need for prophylactic measure
Acoustic Neuroma
Need for prophylactic measure
Acoustic Neuroma

## 2021-02-14 NOTE — PROGRESS NOTE ADULT - ASSESSMENT
61 y/o male diagnosed with COVID-19 a week ago, came in for SOB. Found to be hypoxic on RA due to Covid-19 pneumonia.

## 2021-02-14 NOTE — PROGRESS NOTE ADULT - PROBLEM SELECTOR PLAN 7
stable
On no home med. Ativan prn if needed
stable

## 2021-02-14 NOTE — PROGRESS NOTE ADULT - PROBLEM SELECTOR PLAN 5
134
135  resolved
resolved
Resolved. 135
On no home med. Ativan prn if needed
On no home med. Ativan prn if needed
Resolved.
resolved

## 2021-02-14 NOTE — PROGRESS NOTE ADULT - SUBJECTIVE AND OBJECTIVE BOX
PROGRESS NOTE:     Patient is a 60y old  Male who presents with a chief complaint of shortness of breath (07 Feb 2021 19:03)      SUBJECTIVE / OVERNIGHT EVENTS:  reports fatigue and sob, cough    ADDITIONAL REVIEW OF SYSTEMS:  denies fever     MEDICATIONS  (STANDING):  azithromycin  IVPB      benzonatate 100 milliGRAM(s) Oral every 8 hours  cefTRIAXone   IVPB 1000 milliGRAM(s) IV Intermittent every 24 hours  dexAMETHasone  Injectable 6 milliGRAM(s) IV Push daily  enoxaparin Injectable 40 milliGRAM(s) SubCutaneous every 12 hours  fluticasone propionate 50 MICROgram(s)/spray Nasal Spray 2 Spray(s) Both Nostrils every 12 hours  melatonin 9 milliGRAM(s) Oral at bedtime  remdesivir  IVPB   IV Intermittent   remdesivir  IVPB 100 milliGRAM(s) IV Intermittent every 24 hours    MEDICATIONS  (PRN):  acetaminophen   Tablet .. 1000 milliGRAM(s) Oral every 12 hours PRN Temp greater or equal to 38C (100.4F)  sodium chloride 0.65% Nasal 1 Spray(s) Both Nostrils every 4 hours PRN Nasal Congestion      CAPILLARY BLOOD GLUCOSE        I&O's Summary    07 Feb 2021 07:01  -  08 Feb 2021 07:00  --------------------------------------------------------  IN: 395 mL / OUT: 1050 mL / NET: -655 mL    08 Feb 2021 07:01  -  08 Feb 2021 18:05  --------------------------------------------------------  IN: 0 mL / OUT: 500 mL / NET: -500 mL        PHYSICAL EXAM:  Vital Signs Last 24 Hrs  T(C): 37 (08 Feb 2021 15:52), Max: 37.9 (08 Feb 2021 11:21)  T(F): 98.6 (08 Feb 2021 15:52), Max: 100.3 (08 Feb 2021 11:21)  HR: 79 (08 Feb 2021 15:52) (79 - 93)  BP: 128/68 (08 Feb 2021 15:52) (117/74 - 130/89)  BP(mean): 78 (07 Feb 2021 18:12) (78 - 78)  RR: 20 (08 Feb 2021 15:52) (20 - 21)  SpO2: 92% (08 Feb 2021 15:52) (92% - 95%)    CONSTITUTIONAL: NAD, well-developed  RESPIRATORY: Normal respiratory effort; lungs are diminished breath sounds  to auscultation bilaterally, no wheezes       LABS:                        15.3   5.69  )-----------( 246      ( 07 Feb 2021 06:41 )             47.0     02-08    139  |  96<L>  |  36<H>  ----------------------------<  129<H>  3.8   |  27  |  1.04    Ca    8.7      08 Feb 2021 06:12  Phos  2.5     02-08  Mg     2.7     02-06    TPro  7.0  /  Alb  3.2<L>  /  TBili  1.1  /  DBili  0.5<H>  /  AST  105<H>  /  ALT  67<H>  /  AlkPhos  67  02-08    PT/INR - ( 06 Feb 2021 19:13 )   PT: 15.2 sec;   INR: 1.34 ratio         PTT - ( 06 Feb 2021 19:13 )  PTT:32.4 sec      RADIOLOGY & ADDITIONAL TESTS:  Results Reviewed: y  Imaging Personally Reviewed:y  Electrocardiogram Personally Reviewed:y    COORDINATION OF CARE:  Care Discussed with Consultants/Other Providers [Y/N]:y  Prior or Outpatient Records Reviewed [Y/N]:y  
PROGRESS NOTE:     Patient is a 60y old  Male who presents with a chief complaint of shortness of breath (08 Feb 2021 18:04)    SUBJECTIVE / OVERNIGHT EVENTS:  reports cough, sob , fatigue     ADDITIONAL REVIEW OF SYSTEMS:  denies chills   MEDICATIONS  (STANDING):  azithromycin  IVPB      azithromycin  IVPB 500 milliGRAM(s) IV Intermittent every 24 hours  benzonatate 100 milliGRAM(s) Oral every 8 hours  cefTRIAXone   IVPB 1000 milliGRAM(s) IV Intermittent every 24 hours  dexAMETHasone  Injectable 6 milliGRAM(s) IV Push daily  enoxaparin Injectable 40 milliGRAM(s) SubCutaneous every 12 hours  fluticasone propionate 50 MICROgram(s)/spray Nasal Spray 2 Spray(s) Both Nostrils every 12 hours  loratadine 10 milliGRAM(s) Oral daily  melatonin 9 milliGRAM(s) Oral at bedtime  remdesivir  IVPB   IV Intermittent   remdesivir  IVPB 100 milliGRAM(s) IV Intermittent every 24 hours  sodium chloride 0.65% Nasal 1 Spray(s) Both Nostrils two times a day    MEDICATIONS  (PRN):  acetaminophen   Tablet .. 1000 milliGRAM(s) Oral every 12 hours PRN Temp greater or equal to 38C (100.4F)  sodium chloride 0.65% Nasal 1 Spray(s) Both Nostrils every 4 hours PRN Nasal Congestion      CAPILLARY BLOOD GLUCOSE        I&O's Summary    08 Feb 2021 07:01  -  09 Feb 2021 07:00  --------------------------------------------------------  IN: 250 mL / OUT: 500 mL / NET: -250 mL    09 Feb 2021 07:01  -  09 Feb 2021 16:38  --------------------------------------------------------  IN: 500 mL / OUT: 800 mL / NET: -300 mL        PHYSICAL EXAM:  Vital Signs Last 24 Hrs  T(C): 36.3 (09 Feb 2021 10:00), Max: 36.4 (08 Feb 2021 22:19)  T(F): 97.3 (09 Feb 2021 10:00), Max: 97.5 (08 Feb 2021 22:19)  HR: 67 (09 Feb 2021 10:00) (67 - 79)  BP: 133/82 (09 Feb 2021 10:00) (131/74 - 133/82)  BP(mean): --  RR: 20 (09 Feb 2021 10:00) (20 - 20)  SpO2: 93% (09 Feb 2021 10:00) (93% - 93%)    CONSTITUTIONAL: NAD, well-developed  dry mucous membranes  diminished air entry bilateral, faint wheezes     LABS:                        13.5   4.94  )-----------( 255      ( 09 Feb 2021 06:36 )             40.9     02-09    134<L>  |  96<L>  |  29<H>  ----------------------------<  161<H>  3.7   |  28  |  0.72    Ca    8.4      09 Feb 2021 06:36  Phos  3.4     02-09  Mg     2.7     02-09    TPro  6.3  /  Alb  2.9<L>  /  TBili  1.0  /  DBili  0.5<H>  /  AST  132<H>  /  ALT  101<H>  /  AlkPhos  68  02-09        Culture - Blood (collected 08 Feb 2021 08:34)  Source: .Blood Blood-Peripheral  Preliminary Report (09 Feb 2021 09:01):    No growth to date.        RADIOLOGY & ADDITIONAL TESTS:  Results Reviewed: y  Imaging Personally Reviewed:y  Electrocardiogram Personally Reviewed:y    COORDINATION OF CARE:  Care Discussed with Consultants/Other Providers [Y/N]:y  Prior or Outpatient Records Reviewed [Y/N]:y
PROGRESS NOTE:     Patient is a 60y old  Male who presents with a chief complaint of shortness of breath (09 Feb 2021 16:36)      SUBJECTIVE / OVERNIGHT EVENTS:  denies sob   denies fever    ADDITIONAL REVIEW OF SYSTEMS:  denies headache       MEDICATIONS  (STANDING):  azithromycin  IVPB      azithromycin  IVPB 500 milliGRAM(s) IV Intermittent every 24 hours  benzonatate 100 milliGRAM(s) Oral every 8 hours  cefTRIAXone   IVPB 1000 milliGRAM(s) IV Intermittent every 24 hours  dexAMETHasone  Injectable 6 milliGRAM(s) IV Push daily  enoxaparin Injectable 40 milliGRAM(s) SubCutaneous every 12 hours  fluticasone propionate 50 MICROgram(s)/spray Nasal Spray 2 Spray(s) Both Nostrils every 12 hours  loratadine 10 milliGRAM(s) Oral daily  melatonin 9 milliGRAM(s) Oral at bedtime  remdesivir  IVPB   IV Intermittent   remdesivir  IVPB 100 milliGRAM(s) IV Intermittent every 24 hours  sodium chloride 0.65% Nasal 1 Spray(s) Both Nostrils two times a day    MEDICATIONS  (PRN):  acetaminophen   Tablet .. 1000 milliGRAM(s) Oral every 12 hours PRN Temp greater or equal to 38C (100.4F)  sodium chloride 0.65% Nasal 1 Spray(s) Both Nostrils every 4 hours PRN Nasal Congestion      CAPILLARY BLOOD GLUCOSE        I&O's Summary    09 Feb 2021 07:01  -  10 Feb 2021 07:00  --------------------------------------------------------  IN: 1360 mL / OUT: 2275 mL / NET: -915 mL        PHYSICAL EXAM:  Vital Signs Last 24 Hrs  T(C): 36.2 (10 Feb 2021 09:55), Max: 36.2 (10 Feb 2021 05:13)  T(F): 97.2 (10 Feb 2021 09:55), Max: 97.2 (10 Feb 2021 05:13)  HR: 69 (10 Feb 2021 09:55) (65 - 69)  BP: 127/85 (10 Feb 2021 09:55) (127/85 - 150/83)  BP(mean): --  RR: 20 (10 Feb 2021 09:55) (19 - 20)  SpO2: 97% (10 Feb 2021 09:55) (90% - 97%)    awake, alert oriented x3  dry mucous membranes   diminished air entry bilateral       LABS:                        14.1   7.02  )-----------( 303      ( 10 Feb 2021 07:00 )             42.6     02-10    135  |  97<L>  |  30<H>  ----------------------------<  172<H>  3.8   |  28  |  0.66    Ca    8.7      10 Feb 2021 07:00  Phos  3.2     02-10  Mg     2.5     02-10    TPro  6.4  /  Alb  3.0<L>  /  TBili  1.0  /  DBili  0.5<H>  /  AST  128<H>  /  ALT  134<H>  /  AlkPhos  81  02-10              Culture - Blood (collected 08 Feb 2021 08:34)  Source: .Blood Blood-Peripheral  Preliminary Report (09 Feb 2021 09:01):    No growth to date.        RADIOLOGY & ADDITIONAL TESTS:  Results Reviewed: y  Imaging Personally Reviewed:y  Electrocardiogram Personally Reviewed:y    COORDINATION OF CARE:  Care Discussed with Consultants/Other Providers [Y/N]:y  Prior or Outpatient Records Reviewed [Y/N]:y  
Isidoro Eduardo MD  Internal Medicine Attending  Contact/Pager - [phone]      Patient is a 60y old  Male who presents with a chief complaint of shortness of breath (13 Feb 2021 16:52)        SUBJECTIVE / OVERNIGHT EVENTS: No acute events ON. Pt able to go to bathroom and move around without SOB, but still requiring O2. No acute complaints, feeling better       MEDICATIONS  (STANDING):  benzonatate 100 milliGRAM(s) Oral every 8 hours  dexAMETHasone  Injectable 6 milliGRAM(s) IV Push daily  enoxaparin Injectable 40 milliGRAM(s) SubCutaneous every 12 hours  fluticasone propionate 50 MICROgram(s)/spray Nasal Spray 2 Spray(s) Both Nostrils every 12 hours  loratadine 10 milliGRAM(s) Oral daily  melatonin 9 milliGRAM(s) Oral at bedtime  sodium chloride 0.65% Nasal 1 Spray(s) Both Nostrils two times a day    MEDICATIONS  (PRN):  acetaminophen   Tablet .. 1000 milliGRAM(s) Oral every 12 hours PRN Temp greater or equal to 38C (100.4F)  simethicone 80 milliGRAM(s) Chew every 4 hours PRN Gas  sodium chloride 0.65% Nasal 1 Spray(s) Both Nostrils every 4 hours PRN Nasal Congestion      Allergies    Levaquin (Unknown)    Intolerances          Vital Signs Last 24 Hrs  T(C): 36.7 (13 Feb 2021 22:33), Max: 36.7 (13 Feb 2021 22:33)  T(F): 98 (13 Feb 2021 22:33), Max: 98 (13 Feb 2021 22:33)  HR: 57 (13 Feb 2021 22:33) (57 - 57)  BP: 131/66 (13 Feb 2021 22:33) (131/66 - 131/66)  BP(mean): --  RR: 18 (13 Feb 2021 22:33) (18 - 18)  SpO2: 94% (13 Feb 2021 22:33) (94% - 94%)  CAPILLARY BLOOD GLUCOSE        I&O's Summary        PHYSICAL EXAM:  GENERAL: NAD, obese  HEAD:  AT, NC  EYES: EOMI, PERRLA, conjunctiva and sclera clear  NECK: Supple, No JVD  CHEST/LUNG: CTABL; No wheezing, rhonci, or rales  HEART: RRR; Normal S1, S2. No murmurs, rubs, or gallops  ABDOMEN: Soft, NT, ND; Bowel sounds present. No organomegaly  EXTREMITIES:  2+ Peripheral Pulses, No clubbing, cyanosis, or edema  PSYCH: AAOx3  NEUROLOGY: non-focal  SKIN: Warm, dry, intact; No rashes or lesions      LABS:                        13.2   17.03 )-----------( 375      ( 14 Feb 2021 07:03 )             40.5     02-14    138  |  104  |  20  ----------------------------<  129<H>  4.3   |  26  |  0.77    Ca    8.6      14 Feb 2021 07:03  Phos  3.8     02-14  Mg     2.3     02-14    TPro  5.8<L>  /  Alb  2.8<L>  /  TBili  0.5  /  DBili  <0.2  /  AST  40  /  ALT  107<H>  /  AlkPhos  64  02-14    LIVER FUNCTIONS - ( 14 Feb 2021 07:03 )  Alb: 2.8 g/dL / Pro: 5.8 g/dL / ALK PHOS: 64 U/L / ALT: 107 U/L / AST: 40 U/L / GGT: x                           RADIOLOGY & ADDITIONAL TESTS:    Imaging Personally Reviewed:     Consultant(s) Notes Reviewed:     Care Discussed with Consultants/Other Providers:    
PROGRESS NOTE:     Patient is a 60y old  Male who presents with a chief complaint of shortness of breath (10 Feb 2021 16:08)      SUBJECTIVE / OVERNIGHT EVENTS:  improving sob, cough     ADDITIONAL REVIEW OF SYSTEMS:  denies fever    MEDICATIONS  (STANDING):  azithromycin  IVPB      azithromycin  IVPB 500 milliGRAM(s) IV Intermittent every 24 hours  benzonatate 100 milliGRAM(s) Oral every 8 hours  cefTRIAXone   IVPB 1000 milliGRAM(s) IV Intermittent every 24 hours  dexAMETHasone  Injectable 6 milliGRAM(s) IV Push daily  enoxaparin Injectable 40 milliGRAM(s) SubCutaneous every 12 hours  fluticasone propionate 50 MICROgram(s)/spray Nasal Spray 2 Spray(s) Both Nostrils every 12 hours  loratadine 10 milliGRAM(s) Oral daily  melatonin 9 milliGRAM(s) Oral at bedtime  remdesivir  IVPB 100 milliGRAM(s) IV Intermittent every 24 hours  remdesivir  IVPB   IV Intermittent   sodium chloride 0.65% Nasal 1 Spray(s) Both Nostrils two times a day    MEDICATIONS  (PRN):  acetaminophen   Tablet .. 1000 milliGRAM(s) Oral every 12 hours PRN Temp greater or equal to 38C (100.4F)  simethicone 80 milliGRAM(s) Chew every 4 hours PRN Gas  sodium chloride 0.65% Nasal 1 Spray(s) Both Nostrils every 4 hours PRN Nasal Congestion      CAPILLARY BLOOD GLUCOSE        I&O's Summary    10 Feb 2021 07:01  -  11 Feb 2021 07:00  --------------------------------------------------------  IN: 0 mL / OUT: 1600 mL / NET: -1600 mL    11 Feb 2021 07:01  -  11 Feb 2021 15:36  --------------------------------------------------------  IN: 0 mL / OUT: 900 mL / NET: -900 mL        PHYSICAL EXAM:  Vital Signs Last 24 Hrs  T(C): 36.4 (11 Feb 2021 13:08), Max: 36.6 (10 Feb 2021 21:42)  T(F): 97.6 (11 Feb 2021 13:08), Max: 97.9 (10 Feb 2021 21:42)  HR: 69 (11 Feb 2021 13:08) (60 - 76)  BP: 122/74 (11 Feb 2021 13:08) (122/74 - 142/88)  BP(mean): --  RR: 18 (11 Feb 2021 13:08) (18 - 19)  SpO2: 97% (11 Feb 2021 13:08) (93% - 97%)    CONSTITUTIONAL: NAD, well-developed  RESPIRATORY: Normal respiratory effort; lungs are diminished air entry  to auscultation bilaterally  abdomen soft nontender    LABS:                        14.2   9.86  )-----------( 346      ( 11 Feb 2021 07:07 )             41.9     02-11    135  |  97<L>  |  25<H>  ----------------------------<  150<H>  3.9   |  27  |  0.68    Ca    8.5      11 Feb 2021 07:07  Phos  3.3     02-11  Mg     2.5     02-11    TPro  6.2  /  Alb  3.0<L>  /  TBili  0.9  /  DBili  0.3<H>  /  AST  91<H>  /  ALT  144<H>  /  AlkPhos  80  02-11        RADIOLOGY & ADDITIONAL TESTS:  Results Reviewed: y  Imaging Personally Reviewed:y  Electrocardiogram Personally Reviewed:y    COORDINATION OF CARE:  Care Discussed with Consultants/Other Providers [Y/N]:y  Prior or Outpatient Records Reviewed [Y/N]:y  
PROGRESS NOTE:     Patient is a 60y old  Male who presents with a chief complaint of shortness of breath (11 Feb 2021 15:35)      SUBJECTIVE / OVERNIGHT EVENTS:  improving sob     ADDITIONAL REVIEW OF SYSTEMS:  denies chills or fever    MEDICATIONS  (STANDING):  azithromycin  IVPB      azithromycin  IVPB 500 milliGRAM(s) IV Intermittent every 24 hours  benzonatate 100 milliGRAM(s) Oral every 8 hours  dexAMETHasone  Injectable 6 milliGRAM(s) IV Push daily  enoxaparin Injectable 40 milliGRAM(s) SubCutaneous every 12 hours  fluticasone propionate 50 MICROgram(s)/spray Nasal Spray 2 Spray(s) Both Nostrils every 12 hours  loratadine 10 milliGRAM(s) Oral daily  melatonin 9 milliGRAM(s) Oral at bedtime  sodium chloride 0.65% Nasal 1 Spray(s) Both Nostrils two times a day    MEDICATIONS  (PRN):  acetaminophen   Tablet .. 1000 milliGRAM(s) Oral every 12 hours PRN Temp greater or equal to 38C (100.4F)  simethicone 80 milliGRAM(s) Chew every 4 hours PRN Gas  sodium chloride 0.65% Nasal 1 Spray(s) Both Nostrils every 4 hours PRN Nasal Congestion      CAPILLARY BLOOD GLUCOSE        I&O's Summary    11 Feb 2021 07:01  -  12 Feb 2021 07:00  --------------------------------------------------------  IN: 240 mL / OUT: 1400 mL / NET: -1160 mL    12 Feb 2021 07:01  -  12 Feb 2021 15:54  --------------------------------------------------------  IN: 0 mL / OUT: 350 mL / NET: -350 mL        PHYSICAL EXAM:  Vital Signs Last 24 Hrs  T(C): 37.1 (12 Feb 2021 13:57), Max: 37.1 (12 Feb 2021 13:57)  T(F): 98.8 (12 Feb 2021 13:57), Max: 98.8 (12 Feb 2021 13:57)  HR: 71 (12 Feb 2021 13:57) (60 - 71)  BP: 119/65 (12 Feb 2021 13:57) (119/65 - 145/71)  BP(mean): --  RR: 17 (12 Feb 2021 13:57) (17 - 18)  SpO2: 95% (12 Feb 2021 13:57) (94% - 97%)    awake, alert oriented x3  diminished air entry bilateral lungs       LABS:                        13.4   11.79 )-----------( 337      ( 12 Feb 2021 06:54 )             41.6     02-12    136  |  102  |  25<H>  ----------------------------<  127<H>  4.0   |  24  |  0.72    Ca    8.4      12 Feb 2021 06:54  Phos  3.3     02-11  Mg     2.3     02-12    TPro  5.9<L>  /  Alb  2.8<L>  /  TBili  0.5  /  DBili  0.2  /  AST  72<H>  /  ALT  136<H>  /  AlkPhos  73  02-12        RADIOLOGY & ADDITIONAL TESTS:  Results Reviewed: y  Imaging Personally Reviewed:y  Electrocardiogram Personally Reviewed:n    COORDINATION OF CARE:  Care Discussed with Consultants/Other Providers [Y/N]:y  Prior or Outpatient Records Reviewed [Y/N]:y  
Isidoro Eduardo MD  Internal Medicine Attending  Contact/Pager - [phone]      Patient is a 60y old  Male who presents with a chief complaint of shortness of breath (12 Feb 2021 15:52)        SUBJECTIVE / OVERNIGHT EVENTS: No acute ON events. Pt states SOB has improved, denies any other acute complaints.       MEDICATIONS  (STANDING):  azithromycin  IVPB      azithromycin  IVPB 500 milliGRAM(s) IV Intermittent every 24 hours  benzonatate 100 milliGRAM(s) Oral every 8 hours  dexAMETHasone  Injectable 6 milliGRAM(s) IV Push daily  enoxaparin Injectable 40 milliGRAM(s) SubCutaneous every 12 hours  fluticasone propionate 50 MICROgram(s)/spray Nasal Spray 2 Spray(s) Both Nostrils every 12 hours  loratadine 10 milliGRAM(s) Oral daily  melatonin 9 milliGRAM(s) Oral at bedtime  sodium chloride 0.65% Nasal 1 Spray(s) Both Nostrils two times a day    MEDICATIONS  (PRN):  acetaminophen   Tablet .. 1000 milliGRAM(s) Oral every 12 hours PRN Temp greater or equal to 38C (100.4F)  simethicone 80 milliGRAM(s) Chew every 4 hours PRN Gas  sodium chloride 0.65% Nasal 1 Spray(s) Both Nostrils every 4 hours PRN Nasal Congestion      Allergies    Levaquin (Unknown)    Intolerances          Vital Signs Last 24 Hrs  T(C): 36.6 (13 Feb 2021 14:08), Max: 36.7 (12 Feb 2021 22:57)  T(F): 97.8 (13 Feb 2021 14:08), Max: 98 (12 Feb 2021 22:57)  HR: 82 (13 Feb 2021 14:08) (60 - 82)  BP: 130/71 (13 Feb 2021 14:08) (129/66 - 149/71)  BP(mean): --  RR: 18 (13 Feb 2021 14:08) (17 - 18)  SpO2: 97% (13 Feb 2021 14:08) (95% - 98%)  CAPILLARY BLOOD GLUCOSE        I&O's Summary    12 Feb 2021 07:01  -  13 Feb 2021 07:00  --------------------------------------------------------  IN: 0 mL / OUT: 850 mL / NET: -850 mL          PHYSICAL EXAM:  GENERAL: NAD, obese  HEAD:  AT, NC  EYES: EOMI, PERRLA, conjunctiva and sclera clear  NECK: Supple, No JVD  CHEST/LUNG: CTABL; No wheezing, rhonci, or rales  HEART: RRR; Normal S1, S2. No murmurs, rubs, or gallops  ABDOMEN: Soft, NT, ND; Bowel sounds present. No organomegaly  EXTREMITIES:  2+ Peripheral Pulses, No clubbing, cyanosis, or edema  PSYCH: AAOx3  NEUROLOGY: non-focal  SKIN: Warm, dry, intact; No rashes or lesions      LABS:                        13.0   13.56 )-----------( 345      ( 13 Feb 2021 06:14 )             41.7     02-13    138  |  103  |  22  ----------------------------<  123<H>  4.2   |  24  |  0.72    Ca    8.4      13 Feb 2021 06:14  Phos  3.5     02-13  Mg     2.3     02-13    TPro  5.7<L>  /  Alb  2.8<L>  /  TBili  0.6  /  DBili  0.2  /  AST  57<H>  /  ALT  124<H>  /  AlkPhos  68  02-13    LIVER FUNCTIONS - ( 13 Feb 2021 06:14 )  Alb: 2.8 g/dL / Pro: 5.7 g/dL / ALK PHOS: 68 U/L / ALT: 124 U/L / AST: 57 U/L / GGT: x                           RADIOLOGY & ADDITIONAL TESTS:    Imaging Personally Reviewed:     Consultant(s) Notes Reviewed:     Care Discussed with Consultants/Other Providers:    
Pt seen and examined today. He appears fatigued. States that he has moments of feeling well., and other moments of feeling "not so well". Reports coughing and would like something for it,     MEDICATIONS  (STANDING):  benzonatate 100 milliGRAM(s) Oral every 8 hours  dexAMETHasone  Injectable 6 milliGRAM(s) IV Push daily  enoxaparin Injectable 40 milliGRAM(s) SubCutaneous every 12 hours  fluticasone propionate 50 MICROgram(s)/spray Nasal Spray 2 Spray(s) Both Nostrils every 12 hours  melatonin 9 milliGRAM(s) Oral at bedtime  remdesivir  IVPB   IV Intermittent     MEDICATIONS  (PRN):  acetaminophen   Tablet .. 1000 milliGRAM(s) Oral every 12 hours PRN Temp greater or equal to 38C (100.4F)    Home Medications:  amLODIPine 10 mg oral tablet: 1 tab(s) orally once a day (07 Feb 2021 16:03)  azelastine 137 mcg/inh (0.1%) nasal spray: 2 spray(s) nasal 2 times a day (07 Feb 2021 16:04)  Flonase 50 mcg/inh nasal spray: 2 spray(s) nasal once a day (07 Feb 2021 16:05)  hydroCHLOROthiazide 25 mg oral tablet: 1 tab(s) orally once a day (07 Feb 2021 16:03)  melatonin 10 mg oral tablet: 1.5 tab(s) orally once a day (at bedtime) (07 Feb 2021 16:02)  meloxicam 15 mg oral tablet: 1 tab(s) orally once a day (07 Feb 2021 16:01)      Vital Signs Last 24 Hrs  T(C): 36.9 (07 Feb 2021 09:20), Max: 37.3 (06 Feb 2021 21:10)  T(F): 98.5 (07 Feb 2021 09:20), Max: 99.1 (06 Feb 2021 21:10)  HR: 82 (07 Feb 2021 09:20) (74 - 82)  BP: 121/57 (07 Feb 2021 09:20) (121/57 - 138/65)  BP(mean): --  RR: 20 (07 Feb 2021 09:20) (20 - 23)  SpO2: 95% (07 Feb 2021 09:20) (95% - 100%)    CONSTITUTIONAL: well-developed, well-groomed, no apparent distress  RESPIRATORY: breathing comfortably, lungs CTA without wheeze/rales/rhonchi  CARDDIOVASCULAR: regular rate and rhythm; +S1S2, no murmurs, rubs, or gallops, no lower extremity edema, 2+ peripheral pulses  GASTROINTESTINAL: soft, nontender, nondistended; +BS throughout, no rebound/guarding  MUSCULOSKELETAL: no joint effusions, normal strength and tone of extremities   NEUROLOGIC: non-focal, sensation intact to light touch in b/l upper and lower extremities   PSYCHIATRIC: AAOx3, appropriate mood and affect  SKIN: no rashes or lesions, warm                           15.3   5.69  )-----------( 246      ( 07 Feb 2021 06:41 )             47.0       02-07    x   |  x   |  x   ----------------------------<  x   x    |  x   |  1.03    Ca    8.7      07 Feb 2021 06:41  Phos  2.1     02-06  Mg     2.7     02-06    TPro  6.7  /  Alb  3.0<L>  /  TBili  1.1  /  DBili  0.3<H>  /  AST  104<H>  /  ALT  61<H>  /  AlkPhos  62  02-07            PT/INR - ( 06 Feb 2021 19:13 )   PT: 15.2 sec;   INR: 1.34 ratio         PTT - ( 06 Feb 2021 19:13 )  PTT:32.4 sec    19:13 - VBG - pH: 7.48  | pCO2: 43    | pO2: <24   | Lactate: 3.0

## 2021-02-14 NOTE — PROGRESS NOTE ADULT - PROBLEM SELECTOR PLAN 4
Counselled on diet and exercise
Supplemented
Counselled on diet and exercise
Supplemented  FU AM Phosphorus level
Supplemented

## 2021-02-14 NOTE — PROGRESS NOTE ADULT - PROBLEM SELECTOR PLAN 8
stable
DVT ppx: Lovenox   Dispo; Home pending titrating down O2
DVT ppx: Lovenox   Dispo; Home pending titrating down O2
stable

## 2021-02-14 NOTE — PROGRESS NOTE ADULT - PROBLEM SELECTOR PLAN 3
due to poor oral intake, supplemented
due to poor oral intake, supplemented
50
On admission started on abx for superimposed PNA  s/p azithro 5 days, will d/c
due to poor oral intake, supplemented
due to poor oral intake, supplemented
supplemented   FU AM CMP
due to poor oral intake, supplemented
On admission started on abx for superimposed PNA  s/p azithro 5 days, will d/c

## 2021-02-14 NOTE — PROGRESS NOTE ADULT - PROBLEM SELECTOR PLAN 6
Counselled on diet and exercise once permissible
stable
stable
Counselled on diet and exercise
Counselled on diet and exercise once permissible
Counselled on diet and exercise

## 2021-02-14 NOTE — PROGRESS NOTE ADULT - ATTENDING COMMENTS
O2 turned down to 2L saturating 92-94%, able to ambulate w/o issue  Continue titrating down O2, dispo hopefully Tuesday if keeps improving

## 2021-02-14 NOTE — PROGRESS NOTE ADULT - PROBLEM SELECTOR PROBLEM 5
Hyponatremia
Anxiety Disorder
Hyponatremia
Anxiety Disorder

## 2021-02-14 NOTE — PROGRESS NOTE ADULT - PROBLEM SELECTOR PROBLEM 2
Hypoxia
Respiratory failure with hypoxia
Hypoxia
Hypoxia
Respiratory failure with hypoxia
Hypoxia

## 2021-02-14 NOTE — PROGRESS NOTE ADULT - PROBLEM SELECTOR PLAN 2
Likely due to Covid-19 pneumonia  Management As above

## 2021-02-14 NOTE — PROGRESS NOTE ADULT - PROBLEM SELECTOR PLAN 1
Reports positive COVID-19 diagnosis 7 days ago  CXR on this admission: Bilateral patchy opacities in the mid to lower lung fields  On 4L supplemental nasal cannula  COVID-19 PCR + feb 6th  Continue  decadron  completed remdesivir 5 days   Elevated Inflammatory markers; trend q48-72h   Procal: elevated from yesterday; will monitor: No fever, send Bcx, sent sputum cx, Ddimer: 1092; CTA negative  Continuous pulseox  Prone as tolerated
Reports positive COVID-19 diagnosis 7 days ago  CXR on this admission: Bilateral patchy opacities in the mid to lower lung fields  On 6L supplemental nasal cannula  COVID-19 PCR + feb 6th  Continue on Remdesivir and decadron  Elevated Inflammatory markers; trend q48-72h   Procal: elevated from yesterday; will monitor: No fever, send Bcx, sent sputum cx, Ddimer: 1092; CTA negative  Continuous pulseox  Prone as tolerated
Reports positive COVID-19 diagnosis 7 days ago  CXR on this admission: Bilateral patchy opacities in the mid to lower lung fields  On 6L supplemental nasal cannula  COVID-19 PCR pending  Continue on Remdesivir and decadron  Elevated Inflammatory markers; trend q48-72h   Procal: elevated from yesterday; will monitor: No fever, send Bcx, sent sputum cx, FU repeat procal in the AM  Ddimer: 1092; CTA negative  Continuous pulseox  Prone, Taper, and wean
Reports positive COVID-19 diagnosis 7 days prior to admission  CXR on this admission: Bilateral patchy opacities in the mid to lower lung fields  On 2L supplemental nasal cannula  COVID-19 PCR + feb 6th  Continue decadron  s/p remdesivir 5 days   Elevated Inflammatory markers; trend q48-72h   Procal: elevated from yesterday; will monitor: No fever, send Bcx, sent sputum cx, Ddimer: 1092; CTA negative  Continuous pulse ox
Reports positive COVID-19 diagnosis 7 days ago  CXR on this admission: Bilateral patchy opacities in the mid to lower lung fields  On 5-6L supplemental nasal cannula  COVID-19 PCR + feb 6th  Continue on Remdesivir and decadron  Elevated Inflammatory markers; trend q48-72h   Procal: elevated from yesterday; will monitor: No fever, send Bcx, sent sputum cx, Ddimer: 1092; CTA negative  Continuous pulseox  Prone as tolerated
Reports positive COVID-19 diagnosis 7 days prior to admission  CXR on this admission: Bilateral patchy opacities in the mid to lower lung fields  On 4L supplemental nasal cannula  COVID-19 PCR + feb 6th  Continue decadron  s/p remdesivir 5 days   Elevated Inflammatory markers; trend q48-72h   Procal: elevated from yesterday; will monitor: No fever, send Bcx, sent sputum cx, Ddimer: 1092; CTA negative  Continuous pulse ox

## 2021-02-14 NOTE — PROGRESS NOTE ADULT - PROBLEM SELECTOR PROBLEM 4
Hypophosphatemia
Obesity
Hypophosphatemia
Hypophosphatemia
Obesity

## 2021-02-14 NOTE — PROGRESS NOTE ADULT - PROBLEM SELECTOR PROBLEM 7
Anxiety Disorder
Renal/Ureteral Disease
Renal/Ureteral Disease
Anxiety Disorder

## 2021-02-15 ENCOUNTER — TRANSCRIPTION ENCOUNTER (OUTPATIENT)
Age: 61
End: 2021-02-15

## 2021-02-15 VITALS
SYSTOLIC BLOOD PRESSURE: 118 MMHG | DIASTOLIC BLOOD PRESSURE: 61 MMHG | RESPIRATION RATE: 18 BRPM | HEART RATE: 73 BPM | TEMPERATURE: 98 F | OXYGEN SATURATION: 94 %

## 2021-02-15 LAB
ALBUMIN SERPL ELPH-MCNC: 3 G/DL — LOW (ref 3.3–5)
ALP SERPL-CCNC: 63 U/L — SIGNIFICANT CHANGE UP (ref 40–120)
ALT FLD-CCNC: 96 U/L — HIGH (ref 4–41)
ANION GAP SERPL CALC-SCNC: 12 MMOL/L — SIGNIFICANT CHANGE UP (ref 7–14)
AST SERPL-CCNC: 39 U/L — SIGNIFICANT CHANGE UP (ref 4–40)
BILIRUB DIRECT SERPL-MCNC: <0.2 MG/DL — SIGNIFICANT CHANGE UP (ref 0–0.2)
BILIRUB INDIRECT FLD-MCNC: >0.4 MG/DL — SIGNIFICANT CHANGE UP (ref 0–1)
BILIRUB SERPL-MCNC: 0.6 MG/DL — SIGNIFICANT CHANGE UP (ref 0.2–1.2)
BUN SERPL-MCNC: 20 MG/DL — SIGNIFICANT CHANGE UP (ref 7–23)
CALCIUM SERPL-MCNC: 8.8 MG/DL — SIGNIFICANT CHANGE UP (ref 8.4–10.5)
CHLORIDE SERPL-SCNC: 103 MMOL/L — SIGNIFICANT CHANGE UP (ref 98–107)
CO2 SERPL-SCNC: 23 MMOL/L — SIGNIFICANT CHANGE UP (ref 22–31)
CREAT SERPL-MCNC: 0.73 MG/DL — SIGNIFICANT CHANGE UP (ref 0.5–1.3)
CREAT SERPL-MCNC: 0.74 MG/DL — SIGNIFICANT CHANGE UP (ref 0.5–1.3)
GLUCOSE SERPL-MCNC: 206 MG/DL — HIGH (ref 70–99)
HCT VFR BLD CALC: 42.5 % — SIGNIFICANT CHANGE UP (ref 39–50)
HGB BLD-MCNC: 13.7 G/DL — SIGNIFICANT CHANGE UP (ref 13–17)
MCHC RBC-ENTMCNC: 27.3 PG — SIGNIFICANT CHANGE UP (ref 27–34)
MCHC RBC-ENTMCNC: 32.2 GM/DL — SIGNIFICANT CHANGE UP (ref 32–36)
MCV RBC AUTO: 84.8 FL — SIGNIFICANT CHANGE UP (ref 80–100)
NRBC # BLD: 0 /100 WBCS — SIGNIFICANT CHANGE UP
NRBC # FLD: 0 K/UL — SIGNIFICANT CHANGE UP
PLATELET # BLD AUTO: 390 K/UL — SIGNIFICANT CHANGE UP (ref 150–400)
POTASSIUM SERPL-MCNC: 4.7 MMOL/L — SIGNIFICANT CHANGE UP (ref 3.5–5.3)
POTASSIUM SERPL-SCNC: 4.7 MMOL/L — SIGNIFICANT CHANGE UP (ref 3.5–5.3)
PROCALCITONIN SERPL-MCNC: 0.07 NG/ML — SIGNIFICANT CHANGE UP (ref 0.02–0.1)
PROT SERPL-MCNC: 5.8 G/DL — LOW (ref 6–8.3)
RBC # BLD: 5.01 M/UL — SIGNIFICANT CHANGE UP (ref 4.2–5.8)
RBC # FLD: 13.7 % — SIGNIFICANT CHANGE UP (ref 10.3–14.5)
SODIUM SERPL-SCNC: 138 MMOL/L — SIGNIFICANT CHANGE UP (ref 135–145)
WBC # BLD: 15.5 K/UL — HIGH (ref 3.8–10.5)
WBC # FLD AUTO: 15.5 K/UL — HIGH (ref 3.8–10.5)

## 2021-02-15 PROCEDURE — 99239 HOSP IP/OBS DSCHRG MGMT >30: CPT

## 2021-02-15 RX ORDER — AMLODIPINE BESYLATE 2.5 MG/1
1 TABLET ORAL
Qty: 0 | Refills: 0 | DISCHARGE

## 2021-02-15 RX ORDER — LORATADINE 10 MG/1
1 TABLET ORAL
Qty: 0 | Refills: 0 | DISCHARGE
Start: 2021-02-15

## 2021-02-15 RX ADMIN — Medication 1 SPRAY(S): at 05:08

## 2021-02-15 RX ADMIN — Medication 100 MILLIGRAM(S): at 14:15

## 2021-02-15 RX ADMIN — Medication 1 SPRAY(S): at 05:07

## 2021-02-15 RX ADMIN — Medication 6 MILLIGRAM(S): at 05:07

## 2021-02-15 RX ADMIN — Medication 2 SPRAY(S): at 05:07

## 2021-02-15 RX ADMIN — LORATADINE 10 MILLIGRAM(S): 10 TABLET ORAL at 14:15

## 2021-02-15 RX ADMIN — Medication 100 MILLIGRAM(S): at 05:07

## 2021-02-15 RX ADMIN — ENOXAPARIN SODIUM 40 MILLIGRAM(S): 100 INJECTION SUBCUTANEOUS at 05:07

## 2021-02-15 NOTE — DISCHARGE NOTE PROVIDER - HOSPITAL COURSE
61 y/o male diagnosed with COVID-19 a week ago, came in for SOB. Found to be hypoxic on RA due to Covid-19 pneumonia.     Acute Respiratory Failure with hypoxia secondary to COVID-19 Pneumonia   - Covid positive  - CXR on this admission: Bilateral patchy opacities in the mid to lower lung fields  - Pt placed on isolation precautions   - SP remdesivir and decadron   - Oxygen saturation monitored and supplemental oxygen provided as needed  - Covid inflammatory markers found to be elevated, monitored every 72 hours     Pneumonia  - Concern for superimposed PNA  - s/p azithromycin 5 days    Obesity  - Counselled on diet and exercise.     Anxiety Disorder.   - On no home med. Ativan prn if needed.   - Pt to follow up with PCP for further management as outpatient.     Acoustic Neuroma.   - stable.  - Pt to follow up with PCP for further management as outpatient.     Renal/Ureteral Disease.    - stable.   - Pt to follow up with PCP for further management as outpatient.     Need for prophylactic measure.  - DVT ppx: Lovenox   - Dispo; Home pending titrating down O2.       On_________, discussed with __________, patient is medically cleared and optimized for discharge today. All medications were reviewed with attending, and sent to mutually agreed upon pharmacy.   61 y/o male diagnosed with COVID-19 a week ago, came in for SOB. Found to be hypoxic on RA due to Covid-19 pneumonia.     Acute Respiratory Failure with hypoxia secondary to COVID-19 Pneumonia   - Covid positive  - CXR on this admission: Bilateral patchy opacities in the mid to lower lung fields  - Pt placed on isolation precautions   - SP remdesivir and decadron   - Oxygen saturation monitored and supplemental oxygen provided as needed  - Covid inflammatory markers found to be elevated, monitored every 72 hours     Pneumonia  - Concern for superimposed PNA  - s/p azithromycin 5 days    Obesity  - Counselled on diet and exercise.     Anxiety Disorder.   - On no home med. Ativan prn if needed.   - Pt to follow up with PCP for further management as outpatient.     Acoustic Neuroma.   - stable.  - Pt to follow up with PCP for further management as outpatient.     Renal/Ureteral Disease.    - stable.   - Pt to follow up with PCP for further management as outpatient.     Need for prophylactic measure.  - DVT ppx: Lovenox   - Dispo; Home pending titrating down O2.       On 2/15/2021, discussed with Dr. Pang, patient is medically cleared and optimized for discharge today. All medications were reviewed with attending, and sent to mutually agreed upon pharmacy.   59 y/o male diagnosed with COVID-19 a week prior to admission came in for SOB. Found to be hypoxic on RA due to Covid-19 pneumonia.     Acute Respiratory Failure with hypoxia secondary to COVID-19 Pneumonia   - Covid positive PCR  - CXR on this admission: Bilateral patchy opacities in the mid to lower lung fields  - Pt placed on isolation precautions   - SP remdesivir and decadron   - Oxygen saturation monitored and supplemental oxygen provided as needed  - Covid inflammatory markers found to be elevated, monitored every 72 hours     Pneumonia  - Concern for superimposed  CAP   - s/p rocpehin and azithromycin 5 days    Obesity  - Counselled on diet and exercise.     Anxiety Disorder.   - On no home med. Ativan prn if needed.   - Pt to follow up with PCP for further management as outpatient.     Acoustic Neuroma.   - stable.  - Pt to follow up with PCP for further management as outpatient.     Renal/Ureteral Disease.    - stable.   - Pt to follow up with PCP for further management as outpatient.    On 2/15/2021, discussed with Dr. Pang, patient is medically cleared and optimized for discharge today. All medications were reviewed with attending, and sent to mutually agreed upon pharmacy.    patient seen and examined bedside , on room air ambulated without hypoxia or dyspnea on exertion.   lungs diminished air entry bilateral no wheezes.   counseled on social distancing at home and wearing mask, continue home isolation for 10  more days.   total time spent discharging patient greater than 30 mins

## 2021-02-15 NOTE — DISCHARGE NOTE NURSING/CASE MANAGEMENT/SOCIAL WORK - PATIENT PORTAL LINK FT
You can access the FollowMyHealth Patient Portal offered by [facility] by registering at the following website: [email or web address]. By joining [facility]’s FollowMyHealth portal, you will also be able to view your health information using other applications (apps) compatible with our system.

## 2021-02-15 NOTE — DISCHARGE NOTE PROVIDER - CARE PROVIDER_API CALL
Chele Rader  INTERNAL MEDICINE  [address] [place] [address], [address]  [place] [address]  Phone: [phone]  Fax: [phone]  Follow Up Time:

## 2021-02-15 NOTE — DISCHARGE NOTE PROVIDER - NSDCCPCAREPLAN_GEN_ALL_CORE_FT
PRINCIPAL DISCHARGE DIAGNOSIS  Diagnosis: COVID-19  Assessment and Plan of Treatment: You have been diagnosed with the COVID-19 virus during your hospital stay. You must self quarantine to complete a 14 day time period.  Monitor for fevers, shortness of breath and cough primarily.  Monitor your temperature daily to not any changes and increases.    It has been determined that you no longer need hospitalization and can recover while remaining in self-quarantine at home. You should follow the prevention steps below until a healthcare provider or local or state health department says you can return to your normal activities.  1. You should restrict activities outside your home, except for getting medical care.  2. Do not go to work, school, or public areas.  3. Avoid using public transportation, ride-sharing, or taxis.  4. Separate yourself from other people and animals in your home.  5. Call ahead before visiting your doctor.  6. Wear a facemask.  7. Cover your coughs and sneezes.  8. Clean your hands often.  9. Avoid sharing personal household items.  10. Clean all “high-touch” surfaces everyday.  11. Monitor your symptoms.  If you have a medical emergency and need to call 911, notify the dispatch personnel that you have COVID-19 If possible, put on a facemask before emergency medical services arrive.  12. Stopping home isolation.  Patients with confirmed COVID-19 should remain under home isolation precautions for 14 days since the positive COVID-19 test and until the risk of secondary transmission to others is thought to be low. The decision to discontinue home isolation precautions should be made on a case-by-case basis, in consultation with healthcare providers and state and local health departments. Your [facility] Department of Health can be reached at [phone] for further information about COVID-19.      SECONDARY DISCHARGE DIAGNOSES  Diagnosis: Pneumonia  Assessment and Plan of Treatment: You were treated with azithromycin for secondary imposed bacterial pneumonia.    Diagnosis: Obesity  Assessment and Plan of Treatment: Low salt diet, low carbohydrate diet, exercise as tolerated 30 minutes daily, and follow up with your physician within 1 to 2 weeks. You are to follow up with PCP for further management.    Diagnosis: Acoustic Neuroma  Assessment and Plan of Treatment: Follow up with PCP for further management.    Diagnosis: Anxiety Disorder  Assessment and Plan of Treatment: Follow up with PCP for further management.     PRINCIPAL DISCHARGE DIAGNOSIS  Diagnosis: COVID-19  Assessment and Plan of Treatment: You have been diagnosed with the COVID-19 virus during your hospital stay. You must self quarantine to complete a 14 day time period.  Monitor for fevers, shortness of breath and cough primarily.  Monitor your temperature daily to not any changes and increases.    It has been determined that you no longer need hospitalization and can recover while remaining in self-quarantine at home. You should follow the prevention steps below until a healthcare provider or local or state health department says you can return to your normal activities.  1. You should restrict activities outside your home, except for getting medical care.  2. Do not go to work, school, or public areas.  3. Avoid using public transportation, ride-sharing, or taxis.  4. Separate yourself from other people and animals in your home.  5. Call ahead before visiting your doctor.  6. Wear a facemask.  7. Cover your coughs and sneezes.  8. Clean your hands often.  9. Avoid sharing personal household items.  10. Clean all “high-touch” surfaces everyday.  11. Monitor your symptoms.  If you have a medical emergency and need to call 911, notify the dispatch personnel that you have COVID-19 If possible, put on a facemask before emergency medical services arrive.  12. Stopping home isolation.  Patients with confirmed COVID-19 should remain under home isolation precautions for 14 days since the positive COVID-19 test and until the risk of secondary transmission to others is thought to be low. The decision to discontinue home isolation precautions should be made on a case-by-case basis, in consultation with healthcare providers and state and local health departments. Your [facility] Department of Health can be reached at [phone] for further information about COVID-19.      SECONDARY DISCHARGE DIAGNOSES  Diagnosis: Hypertension  Assessment and Plan of Treatment: Your blood pressure medications were held secondary to infection and low blood pressure. Please follow up with PCP in 1-2 weeks to recheck the blood pressure and restart medications as appropriate.    Diagnosis: Pneumonia  Assessment and Plan of Treatment: You were treated with azithromycin for secondary imposed bacterial pneumonia.    Diagnosis: Obesity  Assessment and Plan of Treatment: Low salt diet, low carbohydrate diet, exercise as tolerated 30 minutes daily, and follow up with your physician within 1 to 2 weeks. You are to follow up with PCP for further management.    Diagnosis: Acoustic Neuroma  Assessment and Plan of Treatment: Follow up with PCP for further management.    Diagnosis: Anxiety Disorder  Assessment and Plan of Treatment: Follow up with PCP for further management.

## 2021-02-15 NOTE — DISCHARGE NOTE PROVIDER - NSDCMRMEDTOKEN_GEN_ALL_CORE_FT
amLODIPine 10 mg oral tablet: 1 tab(s) orally once a day  azelastine 137 mcg/inh (0.1%) nasal spray: 2 spray(s) nasal 2 times a day  Flonase 50 mcg/inh nasal spray: 2 spray(s) nasal once a day  hydroCHLOROthiazide 25 mg oral tablet: 1 tab(s) orally once a day  melatonin 10 mg oral tablet: 1.5 tab(s) orally once a day (at bedtime)  meloxicam 15 mg oral tablet: 1 tab(s) orally once a day   azelastine 137 mcg/inh (0.1%) nasal spray: 2 spray(s) nasal 2 times a day  Flonase 50 mcg/inh nasal spray: 2 spray(s) nasal once a day  loratadine 10 mg oral tablet: 1 tab(s) orally once a day  melatonin 10 mg oral tablet: 1.5 tab(s) orally once a day (at bedtime)  meloxicam 15 mg oral tablet: 1 tab(s) orally once a day

## 2021-02-15 NOTE — DISCHARGE NOTE PROVIDER - NSDCFUADDAPPT_GEN_ALL_CORE_FT
Follow up with your primary care physician for further monitoring in 1-2 weeks. Please call to arrange appointment.   none

## 2021-02-16 ENCOUNTER — TRANSCRIPTION ENCOUNTER (OUTPATIENT)
Age: 61
End: 2021-02-16

## 2021-02-20 LAB
CULTURE RESULTS: SIGNIFICANT CHANGE UP
CULTURE RESULTS: SIGNIFICANT CHANGE UP
SPECIMEN SOURCE: SIGNIFICANT CHANGE UP
SPECIMEN SOURCE: SIGNIFICANT CHANGE UP

## 2021-02-25 ENCOUNTER — TRANSCRIPTION ENCOUNTER (OUTPATIENT)
Age: 61
End: 2021-02-25

## 2021-04-21 ENCOUNTER — NON-APPOINTMENT (OUTPATIENT)
Age: 61
End: 2021-04-21

## 2021-04-26 ENCOUNTER — APPOINTMENT (OUTPATIENT)
Dept: PULMONOLOGY | Facility: CLINIC | Age: 61
End: 2021-04-26
Payer: COMMERCIAL

## 2021-04-26 VITALS
TEMPERATURE: 98 F | WEIGHT: 250 LBS | HEIGHT: 67 IN | BODY MASS INDEX: 39.24 KG/M2 | SYSTOLIC BLOOD PRESSURE: 171 MMHG | HEART RATE: 74 BPM | DIASTOLIC BLOOD PRESSURE: 98 MMHG | OXYGEN SATURATION: 99 %

## 2021-04-26 DIAGNOSIS — U07.1 COVID-19: ICD-10-CM

## 2021-04-26 LAB
BASOPHILS # BLD AUTO: 0.08 K/UL
BASOPHILS NFR BLD AUTO: 1.1 %
EOSINOPHIL # BLD AUTO: 0.28 K/UL
EOSINOPHIL NFR BLD AUTO: 3.8 %
HCT VFR BLD CALC: 50.5 %
HGB BLD-MCNC: 16.2 G/DL
IMM GRANULOCYTES NFR BLD AUTO: 0.3 %
LYMPHOCYTES # BLD AUTO: 2.09 K/UL
LYMPHOCYTES NFR BLD AUTO: 28.5 %
MAN DIFF?: NORMAL
MCHC RBC-ENTMCNC: 28.1 PG
MCHC RBC-ENTMCNC: 32.1 GM/DL
MCV RBC AUTO: 87.5 FL
MONOCYTES # BLD AUTO: 0.5 K/UL
MONOCYTES NFR BLD AUTO: 6.8 %
NEUTROPHILS # BLD AUTO: 4.36 K/UL
NEUTROPHILS NFR BLD AUTO: 59.5 %
PLATELET # BLD AUTO: 246 K/UL
RBC # BLD: 5.77 M/UL
RBC # FLD: 13.2 %
WBC # FLD AUTO: 7.33 K/UL

## 2021-04-26 PROCEDURE — 36415 COLL VENOUS BLD VENIPUNCTURE: CPT

## 2021-04-26 PROCEDURE — 94727 GAS DIL/WSHOT DETER LNG VOL: CPT

## 2021-04-26 PROCEDURE — 94010 BREATHING CAPACITY TEST: CPT

## 2021-04-26 PROCEDURE — ZZZZZ: CPT

## 2021-04-26 PROCEDURE — 71046 X-RAY EXAM CHEST 2 VIEWS: CPT

## 2021-04-26 PROCEDURE — 99072 ADDL SUPL MATRL&STAF TM PHE: CPT

## 2021-04-26 PROCEDURE — 99204 OFFICE O/P NEW MOD 45 MIN: CPT | Mod: 25

## 2021-04-26 PROCEDURE — 94729 DIFFUSING CAPACITY: CPT

## 2021-04-26 RX ORDER — FLUTICASONE PROPIONATE 50 UG/1
50 SPRAY, METERED NASAL
Qty: 16 | Refills: 0 | Status: ACTIVE | COMMUNITY
Start: 2021-01-06

## 2021-04-26 RX ORDER — MELOXICAM 15 MG/1
15 TABLET ORAL
Qty: 30 | Refills: 0 | Status: DISCONTINUED | COMMUNITY
Start: 2020-11-30

## 2021-04-26 NOTE — ASSESSMENT
[FreeTextEntry1] : Appears to be recovered from COVID-19 infection without evidence of significant residual impairment.  Will check antibodies and inflammatory markers.  At this point does not appear to require any specific treatment.

## 2021-04-26 NOTE — HISTORY OF PRESENT ILLNESS
[Never] : never [TextBox_4] : Patient here for evaluation after hospitalization for Covid\par Extract from discharge summary\par Discharge Date 15-Feb-2021\par Admission Date 06-Feb-2021 18:58\par Reason for Admission shortness of breath\par Hospital Course \par 59 y/o male diagnosed with COVID-19 a week prior to admission came in for SOB.\par Found to be hypoxic on RA due to Covid-19 pneumonia.\par \par Acute Respiratory Failure with hypoxia secondary to COVID-19 Pneumonia\par - Covid positive PCR\par - CXR on this admission: Bilateral patchy opacities in the mid to lower lung\par fields\par - Pt placed on isolation precautions\par - SP remdesivir and decadron\par - Oxygen saturation monitored and supplemental oxygen provided as needed\par - Covid inflammatory markers found to be elevated, monitored every 72 hours\par \par Generally feeling better.  No specific complaints at this time.\par

## 2021-04-26 NOTE — PROCEDURE
[FreeTextEntry1] : Patient ambulated on step walker for 1 minute no oxygen desaturation\par \par PFT normal

## 2021-04-27 ENCOUNTER — TRANSCRIPTION ENCOUNTER (OUTPATIENT)
Age: 61
End: 2021-04-27

## 2021-04-27 LAB
ALBUMIN SERPL ELPH-MCNC: 4.9 G/DL
ALP BLD-CCNC: 66 U/L
ALT SERPL-CCNC: 41 U/L
ANION GAP SERPL CALC-SCNC: 15 MMOL/L
AST SERPL-CCNC: 28 U/L
BILIRUB SERPL-MCNC: 0.7 MG/DL
BUN SERPL-MCNC: 18 MG/DL
CALCIUM SERPL-MCNC: 10.1 MG/DL
CHLORIDE SERPL-SCNC: 105 MMOL/L
CO2 SERPL-SCNC: 22 MMOL/L
COVID-19 NUCLEOCAPSID  GAM ANTIBODY INTERPRETATION: POSITIVE
COVID-19 SPIKE DOMAIN ANTIBODY INTERPRETATION: POSITIVE
CREAT SERPL-MCNC: 0.81 MG/DL
CRP SERPL-MCNC: 5 MG/L
DEPRECATED D DIMER PPP IA-ACNC: <150 NG/ML DDU
FERRITIN SERPL-MCNC: 239 NG/ML
GLUCOSE SERPL-MCNC: 101 MG/DL
POTASSIUM SERPL-SCNC: 4.8 MMOL/L
PROCALCITONIN SERPL-MCNC: 0.05 NG/ML
PROT SERPL-MCNC: 7.5 G/DL
SARS-COV-2 AB SERPL IA-ACNC: >250 U/ML
SARS-COV-2 AB SERPL QL IA: 119 INDEX
SODIUM SERPL-SCNC: 142 MMOL/L

## 2021-04-28 LAB — PROCALCITONIN SERPL-MCNC: 0.04 NG/ML

## 2021-09-09 NOTE — H&P ADULT - NSTOBACCOSCREENHP_GEN_A_NCS
Psychotherapy Group Note    PATIENT'S NAME: Joel Pineda  MRN:   [id number]  :   1974  ACCT. NUMBER: [id number]  DATE OF SERVICE: 21  START TIME:  3:00 PM  END TIME:  3:50 PM  FACILITATOR: Lashell Leonard  TOPIC: MH EBP Group: Emotions Management  [facility] Day [facility]  TRACK: [id number]                                      Service Modality:  Video Visit     Telemedicine Visit: The patient's condition can be safely assessed and treated via synchronous audio and visual telemedicine encounter.      Reason for Telemedicine Visit:  covid 19     Originating Site (Patient Location): Patient's home    Distant Site (Provider Location): Provider Remote Setting- Home Office    Consent:  The patient/guardian has verbally consented to: the potential risks and benefits of telemedicine (video visit) versus in person care; bill my insurance or make self-payment for services provided; and responsibility for payment of non-covered services.     Patient would like the video invitation sent by:  My Chart    Mode of Communication:  Video Conference via Medical Zoom    As the provider I attest to compliance with applicable laws and regulations related to telemedicine.          NUMBER OF PARTICIPANTS: 7    Summary of Group / Topics Discussed:  Emotions Management: Opposite to Emotion: Patients discussed past and present struggles with knowing how to make changes in their lives due to difficult emotional experiences.  Explored desires to experience and feel less anger, sadness, guilt, and fear.  Reviewed the therapeutic skill of opposite action and patients explored opportunities to use their behaviors as a tool to reduce an emotion that they want to change.     Patient Session Goals / Objectives:    Review DBT concepts and focus on patient s experiences of distress and difficult emotional experiences.    Learn how to do the opposite of what an emotion makes us want to do in an effort to decrease an  unwanted emotional experience.    Demonstrate understanding of the skill of opposite action by sharing experiences where the technique could be useful in past / present situations.      Patient Participation / Response:  Fully participated with the group by sharing personal reflections / insights and openly received / provided feedback with other participants.    Demonstrated understanding of topics discussed through group discussion and participation, Expressed understanding of the relevance / importance of emotions management skills at distressing times in life and Self-aware of experiences with difficult emotions, and strategies to employ to manage them    Treatment Plan:  Patient has a current master individualized treatment plan.  See Epic treatment plan for more information.    Lashell Leonard     No

## 2023-01-05 ENCOUNTER — NON-APPOINTMENT (OUTPATIENT)
Age: 63
End: 2023-01-05

## 2023-04-18 ENCOUNTER — NON-APPOINTMENT (OUTPATIENT)
Age: 63
End: 2023-04-18

## 2023-04-22 ENCOUNTER — NON-APPOINTMENT (OUTPATIENT)
Age: 63
End: 2023-04-22

## 2023-06-12 ENCOUNTER — NON-APPOINTMENT (OUTPATIENT)
Age: 63
End: 2023-06-12

## 2023-06-12 ENCOUNTER — APPOINTMENT (OUTPATIENT)
Dept: INTERNAL MEDICINE | Facility: CLINIC | Age: 63
End: 2023-06-12
Payer: COMMERCIAL

## 2023-06-12 VITALS
BODY MASS INDEX: 38.45 KG/M2 | HEART RATE: 84 BPM | HEIGHT: 67 IN | DIASTOLIC BLOOD PRESSURE: 98 MMHG | SYSTOLIC BLOOD PRESSURE: 162 MMHG | WEIGHT: 245 LBS

## 2023-06-12 DIAGNOSIS — Z00.00 ENCOUNTER FOR GENERAL ADULT MEDICAL EXAMINATION W/OUT ABNORMAL FINDINGS: ICD-10-CM

## 2023-06-12 DIAGNOSIS — B35.9 DERMATOPHYTOSIS, UNSPECIFIED: ICD-10-CM

## 2023-06-12 DIAGNOSIS — Z82.49 FAMILY HISTORY OF ISCHEMIC HEART DISEASE AND OTHER DISEASES OF THE CIRCULATORY SYSTEM: ICD-10-CM

## 2023-06-12 PROCEDURE — 93000 ELECTROCARDIOGRAM COMPLETE: CPT | Mod: 59

## 2023-06-12 PROCEDURE — 36415 COLL VENOUS BLD VENIPUNCTURE: CPT

## 2023-06-12 PROCEDURE — 99386 PREV VISIT NEW AGE 40-64: CPT | Mod: 25

## 2023-06-12 RX ORDER — PROMETHAZINE HYDROCHLORIDE AND DEXTROMETHORPHAN HYDROBROMIDE ORAL SOLUTION 15; 6.25 MG/5ML; MG/5ML
6.25-15 SOLUTION ORAL
Qty: 118 | Refills: 0 | Status: DISCONTINUED | COMMUNITY
Start: 2023-04-23

## 2023-06-12 RX ORDER — KETOCONAZOLE 20 MG/G
2 CREAM TOPICAL TWICE DAILY
Qty: 1 | Refills: 0 | Status: ACTIVE | COMMUNITY
Start: 2023-06-12 | End: 1900-01-01

## 2023-06-12 RX ORDER — METHYLPREDNISOLONE 4 MG/1
4 TABLET ORAL
Qty: 21 | Refills: 0 | Status: DISCONTINUED | COMMUNITY
Start: 2023-04-19

## 2023-06-12 RX ORDER — BENZONATATE 200 MG/1
200 CAPSULE ORAL
Qty: 21 | Refills: 0 | Status: DISCONTINUED | COMMUNITY
Start: 2023-04-19

## 2023-06-12 RX ORDER — NIRMATRELVIR AND RITONAVIR 300-100 MG
20 X 150 MG & KIT ORAL
Qty: 30 | Refills: 0 | Status: DISCONTINUED | COMMUNITY
Start: 2023-01-06

## 2023-06-13 NOTE — PHYSICAL EXAM
[Normal] : normal rate, regular rhythm, normal S1 and S2 and no murmur heard [Pedal Pulses Present] : the pedal pulses are present [No Extremity Clubbing/Cyanosis] : no extremity clubbing/cyanosis [No Edema] : there was no peripheral edema [Soft] : abdomen soft [Non Tender] : non-tender [Non-distended] : non-distended [Normal Posterior Cervical Nodes] : no posterior cervical lymphadenopathy [No CVA Tenderness] : no CVA  tenderness [Normal Anterior Cervical Nodes] : no anterior cervical lymphadenopathy [No Joint Swelling] : no joint swelling [No Rash] : no rash [No Focal Deficits] : no focal deficits [Normal Affect] : the affect was normal [Normal Mood] : the mood was normal [de-identified] : Central adiposity

## 2023-06-13 NOTE — HISTORY OF PRESENT ILLNESS
[de-identified] : 62 year old male with h/o hypertension, obesity presents for initial annual exam.\par \par overall doing well, no acute issues \par \par s/p echo, stress following covid infection in 2021.  no issues as per patient.  \par \par diet-"eat out too much", some fruits/vegs.  selzer water.  some sodas.  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

## 2023-06-13 NOTE — HEALTH RISK ASSESSMENT
[Good] : ~his/her~  mood as  good [No falls in past year] : Patient reported no falls in the past year [0] : 2) Feeling down, depressed, or hopeless: Not at all (0) [PHQ-2 Negative - No further assessment needed] : PHQ-2 Negative - No further assessment needed [Patient reported colonoscopy was normal] : Patient reported colonoscopy was normal [[id number]] : 0 [ColonoscopyDate] : 2021 [ColonoscopyComments] : gastro- [Never] : Never

## 2023-06-13 NOTE — ASSESSMENT
[FreeTextEntry1] : 62 year old male with h/o hypertension, obesity presents for initial annual exam.\par \par Overweight\par -Being overweight increases your risk of health conditions such as heart disease, high blood pressure, type 2 diabetes, and certain types of cancer. It can also increase your risk for osteoarthritis, sleep apnea, and other respiratory problems. Aim for a slow, steady weight loss. Even a small amount of weight loss can lower your risk of health problems.\par -A safe and healthy way to lose weight is to eat fewer calories and get regular exercise. You can lose up about 1 pound a week by decreasing the number of calories you eat by 500 calories each day. You can decrease calories by eating smaller portion sizes or by cutting out high-calorie foods. Read labels to find out how many calories are in the foods you eat. You can also burn calories with exercise such as walking, swimming, or biking. You will be more likely to keep weight off if you make these changes part of your lifestyle.\par -Exercise at least 30 minutes per day on most days of the week. Some examples of exercise include walking, biking, dancing, and swimming. You can also fit in more physical activity by taking the stairs instead of the elevator or parking farther away from stores.\par \par Hypertension, controlled\par -cont amlodipine as directed \par -educated that increased blood pressure is linked to but limited to increase risk of heart disease, stroke, kidney failure and even death. stressed the importance to lower values by complying to a less than 2g sodium diet, moderate cardiovascular exercise and decrease stress level as recommended by the SPRINT Trial \par -advised to check blood pressure at home with blood pressure cuff at different times of the day and bring to next appt, to eliminate possibility of white coat syndrome.\par \par \par Healthcare Maintenance\par -Advise Yearly Skin cancer screening with Dermatologist \par -Advise Yearly Eye exam with Ophthalmologist\par -Advise Yearly Dental exam\par -Educated of the importance of Healthy diet, such as Mediterranean Diet and Exercise, such as walking >20 minutes a day and increasing gradually as tolerated\par \par Immunizations\par -Flu vaccine \par -Covid vaccine \par -Discussed shingles vaccine (shingrix), advised to verify with insurance if its covered through medical or prescription plan\par \par Preventative screening \par -advised to get colonoscopy for colon cancer screening -up to date \par -will check PSA for prostate cancer screening -labs drawn \par \par \par \par \par

## 2023-06-15 ENCOUNTER — CLINICAL ADVICE (OUTPATIENT)
Age: 63
End: 2023-06-15

## 2023-06-15 LAB
ALBUMIN SERPL ELPH-MCNC: 5 G/DL
ALP BLD-CCNC: 66 U/L
ALT SERPL-CCNC: 23 U/L
ANION GAP SERPL CALC-SCNC: 14 MMOL/L
APPEARANCE: CLEAR
AST SERPL-CCNC: 23 U/L
BACTERIA: NEGATIVE /HPF
BILIRUB DIRECT SERPL-MCNC: 0.2 MG/DL
BILIRUB INDIRECT SERPL-MCNC: 0.8 MG/DL
BILIRUB SERPL-MCNC: 1.1 MG/DL
BILIRUBIN URINE: NEGATIVE
BLOOD URINE: NEGATIVE
BUN SERPL-MCNC: 19 MG/DL
CALCIUM SERPL-MCNC: 9.8 MG/DL
CAST: 1 /LPF
CHLORIDE SERPL-SCNC: 103 MMOL/L
CHOLEST SERPL-MCNC: 219 MG/DL
CO2 SERPL-SCNC: 22 MMOL/L
COLOR: YELLOW
CREAT SERPL-MCNC: 0.87 MG/DL
EGFR: 97 ML/MIN/1.73M2
EPITHELIAL CELLS: 3 /HPF
ESTIMATED AVERAGE GLUCOSE: 114 MG/DL
FERRITIN SERPL-MCNC: 279 NG/ML
FOLATE SERPL-MCNC: 7.4 NG/ML
GLUCOSE QUALITATIVE U: NEGATIVE MG/DL
GLUCOSE SERPL-MCNC: 103 MG/DL
HBA1C MFR BLD HPLC: 5.6 %
HCV AB SER QL: NONREACTIVE
HCV S/CO RATIO: 0.2 S/CO
HDLC SERPL-MCNC: 62 MG/DL
HIV1+2 AB SPEC QL IA.RAPID: NONREACTIVE
IRON SATN MFR SERPL: 29 %
IRON SERPL-MCNC: 97 UG/DL
KETONES URINE: NEGATIVE MG/DL
LDLC SERPL CALC-MCNC: 133 MG/DL
LEUKOCYTE ESTERASE URINE: ABNORMAL
MICROSCOPIC-UA: NORMAL
NITRITE URINE: NEGATIVE
NONHDLC SERPL-MCNC: 157 MG/DL
PH URINE: 5.5
POTASSIUM SERPL-SCNC: 4.3 MMOL/L
PROT SERPL-MCNC: 7.8 G/DL
PROTEIN URINE: NORMAL MG/DL
PSA SERPL-MCNC: 0.44 NG/ML
RED BLOOD CELLS URINE: 0 /HPF
SODIUM SERPL-SCNC: 140 MMOL/L
SPECIFIC GRAVITY URINE: 1.02
TIBC SERPL-MCNC: 337 UG/DL
TRIGL SERPL-MCNC: 118 MG/DL
TSH SERPL-ACNC: 2.04 UIU/ML
UIBC SERPL-MCNC: 240 UG/DL
UROBILINOGEN URINE: 0.2 MG/DL
VIT B12 SERPL-MCNC: 371 PG/ML
WHITE BLOOD CELLS URINE: 12 /HPF

## 2023-08-07 ENCOUNTER — NON-APPOINTMENT (OUTPATIENT)
Age: 63
End: 2023-08-07

## 2024-02-26 ENCOUNTER — RX RENEWAL (OUTPATIENT)
Age: 64
End: 2024-02-26

## 2024-02-26 RX ORDER — AMLODIPINE BESYLATE 5 MG/1
5 TABLET ORAL
Qty: 90 | Refills: 2 | Status: ACTIVE | COMMUNITY
Start: 2021-03-18 | End: 1900-01-01

## 2024-03-06 NOTE — ED ADULT NURSE NOTE - CHIEF COMPLAINT QUOTE
Paxlovid sent to [facility]    Pls advise to remain isolated from day 1-5 of symptom onset.   After this, end isolation if symptoms are improving and no fever for at least 24 hours.   From day 6-10, you must wear a mask inside and outside.    If more SOB or fatigued, go to ER ASAP. Otherwise, rest, fluid and APAP prn.        Pt covid positive on  Friday with O2 86 % RA;  temp 103. c/o body ache, headache .  DEnies N/V

## 2024-05-02 ENCOUNTER — NON-APPOINTMENT (OUTPATIENT)
Age: 64
End: 2024-05-02

## 2024-05-12 ENCOUNTER — NON-APPOINTMENT (OUTPATIENT)
Age: 64
End: 2024-05-12

## 2024-05-15 ENCOUNTER — APPOINTMENT (OUTPATIENT)
Dept: INTERNAL MEDICINE | Facility: CLINIC | Age: 64
End: 2024-05-15
Payer: COMMERCIAL

## 2024-05-15 ENCOUNTER — RESULT REVIEW (OUTPATIENT)
Age: 64
End: 2024-05-15

## 2024-05-15 VITALS
BODY MASS INDEX: 38.77 KG/M2 | DIASTOLIC BLOOD PRESSURE: 91 MMHG | HEIGHT: 67 IN | SYSTOLIC BLOOD PRESSURE: 158 MMHG | OXYGEN SATURATION: 98 % | WEIGHT: 247 LBS | HEART RATE: 60 BPM

## 2024-05-15 DIAGNOSIS — I10 ESSENTIAL (PRIMARY) HYPERTENSION: ICD-10-CM

## 2024-05-15 DIAGNOSIS — R42 DIZZINESS AND GIDDINESS: ICD-10-CM

## 2024-05-15 PROCEDURE — 36415 COLL VENOUS BLD VENIPUNCTURE: CPT

## 2024-05-15 PROCEDURE — 99396 PREV VISIT EST AGE 40-64: CPT

## 2024-05-15 RX ORDER — AZELASTINE HYDROCHLORIDE 137 UG/1
0.1 SPRAY, METERED NASAL
Refills: 0 | Status: ACTIVE | COMMUNITY

## 2024-05-15 NOTE — PHYSICAL EXAM
[Normal] : normal rate, regular rhythm, normal S1 and S2 and no murmur heard [Pedal Pulses Present] : the pedal pulses are present [No Edema] : there was no peripheral edema [No Extremity Clubbing/Cyanosis] : no extremity clubbing/cyanosis [Soft] : abdomen soft [Non Tender] : non-tender [Non-distended] : non-distended [Normal Posterior Cervical Nodes] : no posterior cervical lymphadenopathy [Normal Anterior Cervical Nodes] : no anterior cervical lymphadenopathy [No CVA Tenderness] : no CVA  tenderness [No Joint Swelling] : no joint swelling [No Rash] : no rash [No Focal Deficits] : no focal deficits [Normal Affect] : the affect was normal [Normal Mood] : the mood was normal [de-identified] : Central adiposity

## 2024-05-15 NOTE — ASSESSMENT
[FreeTextEntry1] :  Dizziness, doubt neurogenic etiology but cannot r/o, sounds more vertiginous in nature  -MRI head  -carotid doppler  -decrease salt intake  -Advised if symptoms worsen please call office or go directly to ED for further evaluation.   CVD risk, s/p echo, stress following covid infection in 2021.  no issues as per patient.  no records in chart,  -Advised to have records fax over to review  Obese -discussed possible GLP1 santot, will see if pharmacy has as been on backorder -Being overweight increases your risk of health conditions such as heart disease, high blood pressure, type 2 diabetes, and certain types of cancer. It can also increase your risk for osteoarthritis, sleep apnea, and other respiratory problems. Aim for a slow, steady weight loss. Even a small amount of weight loss can lower your risk of health problems. -A safe and healthy way to lose weight is to eat fewer calories and get regular exercise. You can lose up about 1 pound a week by decreasing the number of calories you eat by 500 calories each day. You can decrease calories by eating smaller portion sizes or by cutting out high-calorie foods. Read labels to find out how many calories are in the foods you eat. You can also burn calories with exercise such as walking, swimming, or biking. You will be more likely to keep weight off if you make these changes part of your lifestyle. -Exercise at least 30 minutes per day on most days of the week. Some examples of exercise include walking, biking, dancing, and swimming. You can also fit in more physical activity by taking the stairs instead of the elevator or parking farther away from stores.  Hypertension, moderately controlled -cont amlodipine as directed  -educated that increased blood pressure is linked to but limited to increase risk of heart disease, stroke, kidney failure and even death. stressed the importance to lower values by complying to a less than 2g sodium diet, moderate cardiovascular exercise and decrease stress level as recommended by the SPRINT Trial  -advised to check blood pressure at home with blood pressure cuff at different times of the day and bring to next appt, to eliminate possibility of white coat syndrome.   Healthcare Maintenance -Advise Yearly Skin cancer screening with Dermatologist  -Advise Yearly Eye exam with Ophthalmologist -Advise Yearly Dental exam -Educated of the importance of Healthy diet, such as Mediterranean Diet and Exercise, such as walking >20 minutes a day and increasing gradually as tolerated  Immunizations -Flu vaccine  -Covid vaccine  -Discussed shingles vaccine (shingrix), advised to verify with insurance if its covered through medical or prescription plan  Preventative screening  -advised to get colonoscopy for colon cancer screening -up to date, have records faxed to review  -will check PSA for prostate cancer screening -labs drawn

## 2024-05-15 NOTE — HISTORY OF PRESENT ILLNESS
[de-identified] : 63 year old male with h/o hypertension, obesity presents for annul exam, also after having episode of dizziness since Monday.    went to work yesterday with no further episodes.  no blurry vision or double vision.  some sinus congestion which is chronic.   no focal weakness.  no chest pain, soboe.  some nausea after performing epley maneuvers.  Did have increase salt consumption on sunday at [employer].   no trouble walking, or extremity weakness.     s/p echo, stress following covid infection in 2021.  no issues as per patient.  no records in chart,   diet-"eat out too much", some fruits/vegs.  selzer water.  some sodas.   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

## 2024-05-15 NOTE — PHYSICAL EXAM
[Normal] : normal rate, regular rhythm, normal S1 and S2 and no murmur heard [Pedal Pulses Present] : the pedal pulses are present [No Edema] : there was no peripheral edema [No Extremity Clubbing/Cyanosis] : no extremity clubbing/cyanosis [Soft] : abdomen soft [Non Tender] : non-tender [Non-distended] : non-distended [Normal Posterior Cervical Nodes] : no posterior cervical lymphadenopathy [Normal Anterior Cervical Nodes] : no anterior cervical lymphadenopathy [No CVA Tenderness] : no CVA  tenderness [No Joint Swelling] : no joint swelling [No Rash] : no rash [No Focal Deficits] : no focal deficits [Normal Affect] : the affect was normal [Normal Mood] : the mood was normal [de-identified] : Central adiposity

## 2024-05-15 NOTE — HEALTH RISK ASSESSMENT
[Good] : ~his/her~  mood as  good [No falls in past year] : Patient reported no falls in the past year [0] : 2) Feeling down, depressed, or hopeless: Not at all (0) [PHQ-2 Negative - No further assessment needed] : PHQ-2 Negative - No further assessment needed [[id number]] : 0 [Patient reported colonoscopy was normal] : Patient reported colonoscopy was normal [ColonoscopyDate] : 2021 [ColonoscopyComments] : gi- Dr Adair  [Never] : Never

## 2024-05-15 NOTE — HISTORY OF PRESENT ILLNESS
[de-identified] : 63 year old male with h/o hypertension, obesity presents for annul exam, also after having episode of dizziness since Monday.    went to work yesterday with no further episodes.  no blurry vision or double vision.  some sinus congestion which is chronic.   no focal weakness.  no chest pain, soboe.  some nausea after performing epley maneuvers.  Did have increase salt consumption on sunday at [employer].   no trouble walking, or extremity weakness.     s/p echo, stress following covid infection in 2021.  no issues as per patient.  no records in chart,   diet-"eat out too much", some fruits/vegs.  selzer water.  some sodas.   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

## 2024-05-15 NOTE — HEALTH RISK ASSESSMENT
[Good] : ~his/her~  mood as  good [No falls in past year] : Patient reported no falls in the past year [0] : 2) Feeling down, depressed, or hopeless: Not at all (0) [PHQ-2 Negative - No further assessment needed] : PHQ-2 Negative - No further assessment needed [[id number]] : 0 [Patient reported colonoscopy was normal] : Patient reported colonoscopy was normal [ColonoscopyDate] : 2021 [ColonoscopyComments] : gi- Dr Adair  [Never] : Never

## 2024-05-21 ENCOUNTER — CLINICAL ADVICE (OUTPATIENT)
Age: 64
End: 2024-05-21

## 2024-05-21 LAB
ALBUMIN SERPL ELPH-MCNC: 4.7 G/DL
ALP BLD-CCNC: 60 U/L
ALT SERPL-CCNC: 19 U/L
ANION GAP SERPL CALC-SCNC: 15 MMOL/L
APPEARANCE: CLEAR
AST SERPL-CCNC: 17 U/L
BACTERIA: NEGATIVE /HPF
BASOPHILS # BLD AUTO: 0.06 K/UL
BASOPHILS NFR BLD AUTO: 0.7 %
BILIRUB DIRECT SERPL-MCNC: 0.2 MG/DL
BILIRUB INDIRECT SERPL-MCNC: 0.8 MG/DL
BILIRUB SERPL-MCNC: 1 MG/DL
BILIRUBIN URINE: NEGATIVE
BLOOD URINE: NEGATIVE
BUN SERPL-MCNC: 16 MG/DL
CALCIUM SERPL-MCNC: 9.5 MG/DL
CAST: 0 /LPF
CHLORIDE SERPL-SCNC: 104 MMOL/L
CHOLEST SERPL-MCNC: 230 MG/DL
CO2 SERPL-SCNC: 21 MMOL/L
COLOR: NORMAL
CREAT SERPL-MCNC: 0.89 MG/DL
EGFR: 96 ML/MIN/1.73M2
EOSINOPHIL # BLD AUTO: 0.29 K/UL
EOSINOPHIL NFR BLD AUTO: 3.4 %
EPITHELIAL CELLS: 2 /HPF
ESTIMATED AVERAGE GLUCOSE: 111 MG/DL
FERRITIN SERPL-MCNC: 236 NG/ML
FOLATE SERPL-MCNC: 7.8 NG/ML
GLUCOSE QUALITATIVE U: NEGATIVE MG/DL
GLUCOSE SERPL-MCNC: 104 MG/DL
HBA1C MFR BLD HPLC: 5.5 %
HCT VFR BLD CALC: 47.3 %
HDLC SERPL-MCNC: 54 MG/DL
HGB BLD-MCNC: 16 G/DL
IMM GRANULOCYTES NFR BLD AUTO: 0.5 %
IRON SATN MFR SERPL: 27 %
IRON SERPL-MCNC: 95 UG/DL
KETONES URINE: NEGATIVE MG/DL
LDLC SERPL CALC-MCNC: 150 MG/DL
LEUKOCYTE ESTERASE URINE: NEGATIVE
LYMPHOCYTES # BLD AUTO: 3.48 K/UL
LYMPHOCYTES NFR BLD AUTO: 40.8 %
MAN DIFF?: NORMAL
MCHC RBC-ENTMCNC: 28.3 PG
MCHC RBC-ENTMCNC: 33.8 GM/DL
MCV RBC AUTO: 83.6 FL
MICROSCOPIC-UA: NORMAL
MONOCYTES # BLD AUTO: 0.54 K/UL
MONOCYTES NFR BLD AUTO: 6.3 %
NEUTROPHILS # BLD AUTO: 4.11 K/UL
NEUTROPHILS NFR BLD AUTO: 48.3 %
NITRITE URINE: NEGATIVE
NONHDLC SERPL-MCNC: 176 MG/DL
PH URINE: 6
PLATELET # BLD AUTO: 209 K/UL
POTASSIUM SERPL-SCNC: 4 MMOL/L
PROT SERPL-MCNC: 7.3 G/DL
PROTEIN URINE: NEGATIVE MG/DL
PSA SERPL-MCNC: 0.48 NG/ML
RBC # BLD: 5.66 M/UL
RBC # FLD: 13.3 %
RED BLOOD CELLS URINE: 0 /HPF
SODIUM SERPL-SCNC: 139 MMOL/L
SPECIFIC GRAVITY URINE: 1.02
TIBC SERPL-MCNC: 346 UG/DL
TRIGL SERPL-MCNC: 144 MG/DL
TSH SERPL-ACNC: 2.83 UIU/ML
UIBC SERPL-MCNC: 252 UG/DL
UROBILINOGEN URINE: 0.2 MG/DL
VIT B12 SERPL-MCNC: 363 PG/ML
WBC # FLD AUTO: 8.52 K/UL
WHITE BLOOD CELLS URINE: 10 /HPF

## 2024-06-06 ENCOUNTER — APPOINTMENT (OUTPATIENT)
Dept: ULTRASOUND IMAGING | Facility: CLINIC | Age: 64
End: 2024-06-06
Payer: COMMERCIAL

## 2024-06-06 ENCOUNTER — CLINICAL ADVICE (OUTPATIENT)
Age: 64
End: 2024-06-06

## 2024-06-06 ENCOUNTER — APPOINTMENT (OUTPATIENT)
Dept: MRI IMAGING | Facility: CLINIC | Age: 64
End: 2024-06-06
Payer: COMMERCIAL

## 2024-06-06 ENCOUNTER — OUTPATIENT (OUTPATIENT)
Dept: OUTPATIENT SERVICES | Facility: HOSPITAL | Age: 64
LOS: 1 days | End: 2024-06-06
Payer: COMMERCIAL

## 2024-06-06 DIAGNOSIS — R42 DIZZINESS AND GIDDINESS: ICD-10-CM

## 2024-06-06 PROCEDURE — 70553 MRI BRAIN STEM W/O & W/DYE: CPT

## 2024-06-06 PROCEDURE — A9585: CPT

## 2024-06-06 PROCEDURE — 70553 MRI BRAIN STEM W/O & W/DYE: CPT | Mod: 26

## 2024-06-06 PROCEDURE — 93880 EXTRACRANIAL BILAT STUDY: CPT | Mod: 26

## 2024-06-06 PROCEDURE — 93880 EXTRACRANIAL BILAT STUDY: CPT

## 2024-06-06 RX ORDER — ATORVASTATIN CALCIUM 20 MG/1
20 TABLET, FILM COATED ORAL
Qty: 90 | Refills: 3 | Status: ACTIVE | COMMUNITY
Start: 2024-06-06 | End: 1900-01-01

## 2024-06-10 ENCOUNTER — TRANSCRIPTION ENCOUNTER (OUTPATIENT)
Age: 64
End: 2024-06-10

## 2024-06-11 ENCOUNTER — APPOINTMENT (OUTPATIENT)
Dept: INTERNAL MEDICINE | Facility: CLINIC | Age: 64
End: 2024-06-11
Payer: COMMERCIAL

## 2024-06-11 DIAGNOSIS — D33.3 BENIGN NEOPLASM OF CRANIAL NERVES: ICD-10-CM

## 2024-06-11 PROCEDURE — 99213 OFFICE O/P EST LOW 20 MIN: CPT

## 2024-06-12 NOTE — ASSESSMENT
[FreeTextEntry1] :  Vestibular schwannoma, status post CyberKnife -Send copy to Dr. Dotson, ENT to compare to previous study.    CVD risk, s/p echo, stress following covid infection in 2021.  no issues as per patient.  no records in chart,  -Advised to have records fax over to review  Obese -start Zepbound, risks and benefits of medication discussed in detail no known personal or fmhx of thyroid disorder or MEN -Being overweight increases your risk of health conditions such as heart disease, high blood pressure, type 2 diabetes, and certain types of cancer. It can also increase your risk for osteoarthritis, sleep apnea, and other respiratory problems. Aim for a slow, steady weight loss. Even a small amount of weight loss can lower your risk of health problems. -A safe and healthy way to lose weight is to eat fewer calories and get regular exercise. You can lose up about 1 pound a week by decreasing the number of calories you eat by 500 calories each day. You can decrease calories by eating smaller portion sizes or by cutting out high-calorie foods. Read labels to find out how many calories are in the foods you eat. You can also burn calories with exercise such as walking, swimming, or biking. You will be more likely to keep weight off if you make these changes part of your lifestyle. -Exercise at least 30 minutes per day on most days of the week. Some examples of exercise include walking, biking, dancing, and swimming. You can also fit in more physical activity by taking the stairs instead of the elevator or parking farther away from stores.  Hypertension, moderately controlled -cont amlodipine as directed  -educated that increased blood pressure is linked to but limited to increase risk of heart disease, stroke, kidney failure and even death. stressed the importance to lower values by complying to a less than 2g sodium diet, moderate cardiovascular exercise and decrease stress level as recommended by the SPRINT Trial  -advised to check blood pressure at home with blood pressure cuff at different times of the day and bring to next appt, to eliminate possibility of white coat syndrome.

## 2024-06-12 NOTE — HISTORY OF PRESENT ILLNESS
[Home] : at home, [unfilled] , at the time of the visit. [Medical Office: ([facility])___] : at the medical office located in  [Verbal consent obtained from patient] : the patient, [unfilled] [de-identified] : 63 year old male with h/o hypertension, obesity presents for follow-up   MRI results discussed with patient.  Patient has known vestibular schwannoma status post CyberKnife follows with Dr. Dotson regularly  Interested in starting weight loss medication.  Has been struggling over the last several years despite healthier diet.  went to work yesterday with no further episodes.  no blurry vision or double vision.  some sinus congestion which is chronic.   no focal weakness.  no chest pain, soboe.  some nausea after performing epley maneuvers.  Did have increase salt consumption on sunday at [employer].   no trouble walking, or extremity weakness.     s/p echo, stress following covid infection in 2021.  no issues as per patient.  no records in chart,   diet-"eat out too much", some fruits/vegs.  selzer water.  some sodas.   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

## 2024-06-12 NOTE — PHYSICAL EXAM
[de-identified] : limited due to telehealth.  speaking in complete sentences in no acute distress

## 2024-07-16 ENCOUNTER — NON-APPOINTMENT (OUTPATIENT)
Age: 64
End: 2024-07-16

## 2024-11-13 ENCOUNTER — NON-APPOINTMENT (OUTPATIENT)
Age: 64
End: 2024-11-13

## 2024-12-02 ENCOUNTER — NON-APPOINTMENT (OUTPATIENT)
Age: 64
End: 2024-12-02

## 2025-04-07 ENCOUNTER — NON-APPOINTMENT (OUTPATIENT)
Age: 65
End: 2025-04-07

## 2025-04-11 ENCOUNTER — APPOINTMENT (OUTPATIENT)
Dept: ORTHOPEDIC SURGERY | Facility: CLINIC | Age: 65
End: 2025-04-11
Payer: COMMERCIAL

## 2025-04-11 ENCOUNTER — NON-APPOINTMENT (OUTPATIENT)
Age: 65
End: 2025-04-11

## 2025-04-11 VITALS — SYSTOLIC BLOOD PRESSURE: 143 MMHG | DIASTOLIC BLOOD PRESSURE: 85 MMHG | HEART RATE: 71 BPM

## 2025-04-11 VITALS — HEIGHT: 67 IN | BODY MASS INDEX: 37.67 KG/M2 | WEIGHT: 240 LBS

## 2025-04-11 DIAGNOSIS — M17.0 BILATERAL PRIMARY OSTEOARTHRITIS OF KNEE: ICD-10-CM

## 2025-04-11 PROCEDURE — 73564 X-RAY EXAM KNEE 4 OR MORE: CPT | Mod: RT

## 2025-04-11 PROCEDURE — 99203 OFFICE O/P NEW LOW 30 MIN: CPT

## 2025-04-11 RX ORDER — HYALURONATE SOD, CROSS-LINKED 30 MG/3 ML
30 SYRINGE (ML) INTRAARTICULAR
Qty: 1 | Refills: 0 | Status: ACTIVE | COMMUNITY
Start: 2025-04-11 | End: 1900-01-01

## 2025-06-02 ENCOUNTER — RX RENEWAL (OUTPATIENT)
Age: 65
End: 2025-06-02

## 2025-07-14 ENCOUNTER — LABORATORY RESULT (OUTPATIENT)
Age: 65
End: 2025-07-14

## 2025-07-14 ENCOUNTER — APPOINTMENT (OUTPATIENT)
Dept: INTERNAL MEDICINE | Facility: CLINIC | Age: 65
End: 2025-07-14
Payer: COMMERCIAL

## 2025-07-14 VITALS
OXYGEN SATURATION: 95 % | BODY MASS INDEX: 37.51 KG/M2 | RESPIRATION RATE: 16 BRPM | SYSTOLIC BLOOD PRESSURE: 151 MMHG | HEART RATE: 71 BPM | WEIGHT: 239 LBS | DIASTOLIC BLOOD PRESSURE: 80 MMHG | HEIGHT: 67 IN

## 2025-07-14 PROBLEM — R42 VERTIGO: Status: ACTIVE | Noted: 2025-07-14

## 2025-07-14 PROBLEM — Z23 ENCOUNTER FOR IMMUNIZATION: Status: ACTIVE | Noted: 2025-07-14 | Resolved: 2025-07-28

## 2025-07-14 PROCEDURE — 93000 ELECTROCARDIOGRAM COMPLETE: CPT

## 2025-07-14 PROCEDURE — 99397 PER PM REEVAL EST PAT 65+ YR: CPT | Mod: 25

## 2025-07-14 PROCEDURE — G0009: CPT

## 2025-07-14 PROCEDURE — 90677 PCV20 VACCINE IM: CPT

## 2025-07-14 PROCEDURE — 36415 COLL VENOUS BLD VENIPUNCTURE: CPT

## 2025-07-14 RX ORDER — TIRZEPATIDE 2.5 MG/.5ML
2.5 INJECTION, SOLUTION SUBCUTANEOUS
Qty: 1 | Refills: 0 | Status: ACTIVE | COMMUNITY
Start: 2025-07-14 | End: 1900-01-01

## 2025-07-16 ENCOUNTER — APPOINTMENT (OUTPATIENT)
Dept: ORTHOPEDIC SURGERY | Facility: CLINIC | Age: 65
End: 2025-07-16
Payer: COMMERCIAL

## 2025-07-16 PROBLEM — R17 ELEVATED BILIRUBIN: Status: ACTIVE | Noted: 2025-07-16

## 2025-07-16 LAB
ALBUMIN SERPL ELPH-MCNC: 4.7 G/DL
ALP BLD-CCNC: 71 U/L
ALT SERPL-CCNC: 25 U/L
ANION GAP SERPL CALC-SCNC: 14 MMOL/L
APPEARANCE: CLEAR
AST SERPL-CCNC: 19 U/L
BACTERIA: NEGATIVE /HPF
BASOPHILS # BLD AUTO: 0.07 K/UL
BASOPHILS NFR BLD AUTO: 0.9 %
BILIRUB DIRECT SERPL-MCNC: 0.51 MG/DL
BILIRUB INDIRECT SERPL-MCNC: 1.3 MG/DL
BILIRUB SERPL-MCNC: 1.8 MG/DL
BILIRUBIN URINE: NEGATIVE
BLOOD URINE: NEGATIVE
BUN SERPL-MCNC: 19 MG/DL
CALCIUM SERPL-MCNC: 9.9 MG/DL
CAST: 1 /LPF
CCP AB SER IA-ACNC: <8 U/ML
CCP AB SER QL: NEGATIVE
CHLORIDE SERPL-SCNC: 104 MMOL/L
CHOLEST SERPL-MCNC: 135 MG/DL
CK SERPL-CCNC: 130 U/L
CO2 SERPL-SCNC: 23 MMOL/L
COLOR: YELLOW
CREAT SERPL-MCNC: 0.9 MG/DL
CRP SERPL-MCNC: 3 MG/L
EGFRCR SERPLBLD CKD-EPI 2021: 95 ML/MIN/1.73M2
EOSINOPHIL # BLD AUTO: 0.31 K/UL
EOSINOPHIL NFR BLD AUTO: 4 %
EPITHELIAL CELLS: 2 /HPF
ERYTHROCYTE [SEDIMENTATION RATE] IN BLOOD BY WESTERGREN METHOD: 12 MM/HR
ESTIMATED AVERAGE GLUCOSE: 114 MG/DL
FERRITIN SERPL-MCNC: 325 NG/ML
FOLATE SERPL-MCNC: 9.3 NG/ML
GLUCOSE QUALITATIVE U: NEGATIVE MG/DL
GLUCOSE SERPL-MCNC: 104 MG/DL
HBA1C MFR BLD HPLC: 5.6 %
HCT VFR BLD CALC: 48 %
HDLC SERPL-MCNC: 56 MG/DL
HGB BLD-MCNC: 15.7 G/DL
IMM GRANULOCYTES NFR BLD AUTO: 0.3 %
IRON SATN MFR SERPL: 32 %
IRON SERPL-MCNC: 98 UG/DL
KETONES URINE: NEGATIVE MG/DL
LDLC SERPL-MCNC: 60 MG/DL
LEUKOCYTE ESTERASE URINE: ABNORMAL
LYMPHOCYTES # BLD AUTO: 2.46 K/UL
LYMPHOCYTES NFR BLD AUTO: 32 %
MAN DIFF?: NORMAL
MCHC RBC-ENTMCNC: 28.7 PG
MCHC RBC-ENTMCNC: 32.7 G/DL
MCV RBC AUTO: 87.8 FL
MICROSCOPIC-UA: NORMAL
MONOCYTES # BLD AUTO: 0.64 K/UL
MONOCYTES NFR BLD AUTO: 8.3 %
NEUTROPHILS # BLD AUTO: 4.18 K/UL
NEUTROPHILS NFR BLD AUTO: 54.5 %
NITRITE URINE: NEGATIVE
NONHDLC SERPL-MCNC: 79 MG/DL
PH URINE: 5.5
PLATELET # BLD AUTO: 185 K/UL
POTASSIUM SERPL-SCNC: 4.3 MMOL/L
PROT SERPL-MCNC: 7.4 G/DL
PROTEIN URINE: NEGATIVE MG/DL
PSA SERPL-MCNC: 0.46 NG/ML
RBC # BLD: 5.47 M/UL
RBC # FLD: 13.3 %
RED BLOOD CELLS URINE: 1 /HPF
RHEUMATOID FACT SERPL-ACNC: <10 IU/ML
SODIUM SERPL-SCNC: 141 MMOL/L
SPECIFIC GRAVITY URINE: 1.03
TIBC SERPL-MCNC: 301 UG/DL
TRIGL SERPL-MCNC: 106 MG/DL
TSH SERPL-ACNC: 1.66 UIU/ML
UIBC SERPL-MCNC: 203 UG/DL
URATE SERPL-MCNC: 5.6 MG/DL
UROBILINOGEN URINE: 0.2 MG/DL
VIT B12 SERPL-MCNC: 385 PG/ML
WBC # FLD AUTO: 7.68 K/UL
WHITE BLOOD CELLS URINE: 2 /HPF

## 2025-07-16 PROCEDURE — 99214 OFFICE O/P EST MOD 30 MIN: CPT | Mod: 25

## 2025-07-16 PROCEDURE — 20610 DRAIN/INJ JOINT/BURSA W/O US: CPT | Mod: RT

## 2025-07-17 ENCOUNTER — NON-APPOINTMENT (OUTPATIENT)
Age: 65
End: 2025-07-17

## 2025-07-17 ENCOUNTER — APPOINTMENT (OUTPATIENT)
Dept: DERMATOLOGY | Facility: CLINIC | Age: 65
End: 2025-07-17
Payer: COMMERCIAL

## 2025-07-17 VITALS — BODY MASS INDEX: 37.51 KG/M2 | HEIGHT: 67 IN | WEIGHT: 239 LBS

## 2025-07-17 PROBLEM — R22.9 SKIN NODULE: Status: ACTIVE | Noted: 2025-07-17

## 2025-07-17 PROBLEM — L40.9 PSORIASIS: Status: ACTIVE | Noted: 2025-07-17

## 2025-07-17 PROBLEM — B36.0 TINEA VERSICOLOR: Status: ACTIVE | Noted: 2025-07-17

## 2025-07-17 PROCEDURE — 99204 OFFICE O/P NEW MOD 45 MIN: CPT

## 2025-07-17 RX ORDER — CLOBETASOL PROPIONATE 0.5 MG/G
0.05 OINTMENT TOPICAL
Qty: 1 | Refills: 3 | Status: ACTIVE | COMMUNITY
Start: 2025-07-17 | End: 1900-01-01

## 2025-07-17 RX ORDER — KETOCONAZOLE 20 MG/ML
2 SHAMPOO TOPICAL
Qty: 1 | Refills: 11 | Status: ACTIVE | COMMUNITY
Start: 2025-07-17 | End: 1900-01-01

## 2025-07-21 ENCOUNTER — CLINICAL ADVICE (OUTPATIENT)
Age: 65
End: 2025-07-21

## 2025-07-21 ENCOUNTER — NON-APPOINTMENT (OUTPATIENT)
Age: 65
End: 2025-07-21

## 2025-07-21 LAB
ENA SS-A AB SER-ACNC: <0.2 AL
ENA SS-B AB SER-ACNC: <0.2 AL

## 2025-07-22 ENCOUNTER — CLINICAL ADVICE (OUTPATIENT)
Age: 65
End: 2025-07-22

## 2025-07-22 LAB
ANA TITR SER: ABNORMAL
ANACR T: ABNORMAL

## 2025-07-23 ENCOUNTER — TRANSCRIPTION ENCOUNTER (OUTPATIENT)
Age: 65
End: 2025-07-23

## 2025-07-23 ENCOUNTER — CLINICAL ADVICE (OUTPATIENT)
Age: 65
End: 2025-07-23

## 2025-07-23 DIAGNOSIS — M25.50 PAIN IN UNSPECIFIED JOINT: ICD-10-CM

## 2025-07-25 ENCOUNTER — TRANSCRIPTION ENCOUNTER (OUTPATIENT)
Age: 65
End: 2025-07-25

## 2025-08-04 ENCOUNTER — CLINICAL ADVICE (OUTPATIENT)
Age: 65
End: 2025-08-04

## 2025-08-04 ENCOUNTER — LABORATORY RESULT (OUTPATIENT)
Age: 65
End: 2025-08-04

## 2025-08-12 ENCOUNTER — NON-APPOINTMENT (OUTPATIENT)
Age: 65
End: 2025-08-12

## 2025-08-12 ENCOUNTER — APPOINTMENT (OUTPATIENT)
Dept: INTERNAL MEDICINE | Facility: CLINIC | Age: 65
End: 2025-08-12

## 2025-08-12 ENCOUNTER — OUTPATIENT (OUTPATIENT)
Dept: OUTPATIENT SERVICES | Facility: HOSPITAL | Age: 65
LOS: 1 days | End: 2025-08-12

## 2025-08-12 ENCOUNTER — TRANSCRIPTION ENCOUNTER (OUTPATIENT)
Age: 65
End: 2025-08-12

## 2025-08-13 ENCOUNTER — TRANSCRIPTION ENCOUNTER (OUTPATIENT)
Age: 65
End: 2025-08-13

## 2025-08-14 ENCOUNTER — NON-APPOINTMENT (OUTPATIENT)
Age: 65
End: 2025-08-14

## 2025-08-18 ENCOUNTER — APPOINTMENT (OUTPATIENT)
Dept: PHARMACY | Facility: CLINIC | Age: 65
End: 2025-08-18
Payer: COMMERCIAL

## 2025-08-18 PROCEDURE — V5014D: CUSTOM | Mod: RT

## 2025-09-08 ENCOUNTER — TRANSCRIPTION ENCOUNTER (OUTPATIENT)
Age: 65
End: 2025-09-08

## 2025-09-16 ENCOUNTER — APPOINTMENT (OUTPATIENT)
Dept: INTERNAL MEDICINE | Facility: CLINIC | Age: 65
End: 2025-09-16
Payer: COMMERCIAL

## 2025-09-16 VITALS — BODY MASS INDEX: 36.18 KG/M2 | WEIGHT: 231 LBS

## 2025-09-16 DIAGNOSIS — I10 ESSENTIAL (PRIMARY) HYPERTENSION: ICD-10-CM

## 2025-09-16 PROCEDURE — 99213 OFFICE O/P EST LOW 20 MIN: CPT | Mod: 95

## 2025-09-16 PROCEDURE — G2211 COMPLEX E/M VISIT ADD ON: CPT | Mod: 95
